# Patient Record
Sex: MALE | Race: WHITE | Employment: FULL TIME | URBAN - METROPOLITAN AREA
[De-identification: names, ages, dates, MRNs, and addresses within clinical notes are randomized per-mention and may not be internally consistent; named-entity substitution may affect disease eponyms.]

---

## 2018-01-30 ENCOUNTER — HOSPITAL ENCOUNTER (OUTPATIENT)
Dept: NUCLEAR MEDICINE | Age: 58
Discharge: HOME | End: 2018-01-30
Attending: PODIATRIST
Payer: COMMERCIAL

## 2018-01-30 ENCOUNTER — HOSPITAL ENCOUNTER (OUTPATIENT)
Dept: LAB | Age: 58
Discharge: HOME | End: 2018-01-30

## 2018-01-30 DIAGNOSIS — M86.172 ACUTE OSTEOMYELITIS OF LEFT ANKLE OR FOOT (HCC): ICD-10-CM

## 2018-01-30 LAB
BASOPHILS # BLD: 0 K/UL (ref 0–0.06)
BASOPHILS NFR BLD: 0 % (ref 0–2)
DIFFERENTIAL METHOD BLD: ABNORMAL
EOSINOPHIL # BLD: 0.7 K/UL (ref 0–0.4)
EOSINOPHIL NFR BLD: 9 % (ref 0–5)
ERYTHROCYTE [DISTWIDTH] IN BLOOD BY AUTOMATED COUNT: 12.2 % (ref 11.6–14.5)
ERYTHROCYTE [SEDIMENTATION RATE] IN BLOOD: 1 MM/HR (ref 0–20)
HCT VFR BLD AUTO: 42.4 % (ref 36–48)
HGB BLD-MCNC: 14.6 G/DL (ref 13–16)
INR PPP: 0.9 (ref 0.8–1.2)
LYMPHOCYTES # BLD: 1.4 K/UL (ref 0.9–3.6)
LYMPHOCYTES NFR BLD: 19 % (ref 21–52)
MCH RBC QN AUTO: 31.3 PG (ref 24–34)
MCHC RBC AUTO-ENTMCNC: 34.4 G/DL (ref 31–37)
MCV RBC AUTO: 90.8 FL (ref 74–97)
MONOCYTES # BLD: 1 K/UL (ref 0.05–1.2)
MONOCYTES NFR BLD: 13 % (ref 3–10)
NEUTS SEG # BLD: 4.3 K/UL (ref 1.8–8)
NEUTS SEG NFR BLD: 59 % (ref 40–73)
PLATELET # BLD AUTO: 196 K/UL (ref 135–420)
PMV BLD AUTO: 9.7 FL (ref 9.2–11.8)
PROTHROMBIN TIME: 12 SEC (ref 11.5–15.2)
RBC # BLD AUTO: 4.67 M/UL (ref 4.7–5.5)
WBC # BLD AUTO: 7.4 K/UL (ref 4.6–13.2)

## 2018-01-30 PROCEDURE — 85610 PROTHROMBIN TIME: CPT | Performed by: PODIATRIST

## 2018-01-30 PROCEDURE — 85652 RBC SED RATE AUTOMATED: CPT | Performed by: PODIATRIST

## 2018-01-30 PROCEDURE — 36415 COLL VENOUS BLD VENIPUNCTURE: CPT | Performed by: PODIATRIST

## 2018-01-30 PROCEDURE — 78315 BONE IMAGING 3 PHASE: CPT

## 2018-01-30 PROCEDURE — 85025 COMPLETE CBC W/AUTO DIFF WBC: CPT | Performed by: PODIATRIST

## 2018-01-30 RX ORDER — IBUPROFEN 200 MG
400 TABLET ORAL
COMMUNITY
End: 2022-04-29 | Stop reason: ALTCHOICE

## 2018-01-30 RX ORDER — CEPHALEXIN 500 MG/1
CAPSULE ORAL 2 TIMES DAILY
COMMUNITY
End: 2018-03-02

## 2018-01-30 RX ORDER — AMLODIPINE BESYLATE 10 MG/1
10 TABLET ORAL DAILY
COMMUNITY

## 2018-01-30 RX ORDER — FLUTICASONE PROPIONATE 50 MCG
2 SPRAY, SUSPENSION (ML) NASAL DAILY
COMMUNITY

## 2018-01-30 RX ORDER — LOSARTAN POTASSIUM 50 MG/1
100 TABLET ORAL DAILY
COMMUNITY

## 2018-01-30 NOTE — PROGRESS NOTES
Pre call completed. PT aware of need to hold anticoagulants per protocol. PT aware of arrival time pre procedure. Pt states no questions at this time.

## 2018-02-01 ENCOUNTER — HOSPITAL ENCOUNTER (OUTPATIENT)
Dept: NUCLEAR MEDICINE | Age: 58
Discharge: HOME | End: 2018-02-01
Attending: PODIATRIST
Payer: COMMERCIAL

## 2018-02-01 DIAGNOSIS — M86.172 ACUTE OSTEOMYELITIS OF LEFT ANKLE OR FOOT (HCC): ICD-10-CM

## 2018-02-01 PROCEDURE — 78805 NM INFLAM PROC LTD: CPT

## 2018-02-02 ENCOUNTER — HOSPITAL ENCOUNTER (OUTPATIENT)
Dept: INFUSION THERAPY | Age: 58
Discharge: HOME | End: 2018-02-02
Payer: COMMERCIAL

## 2018-02-02 ENCOUNTER — HOSPITAL ENCOUNTER (OUTPATIENT)
Dept: INTERVENTIONAL RADIOLOGY/VASCULAR | Age: 58
Discharge: HOME | End: 2018-02-02
Attending: PODIATRIST
Payer: COMMERCIAL

## 2018-02-02 VITALS
OXYGEN SATURATION: 97 % | RESPIRATION RATE: 18 BRPM | BODY MASS INDEX: 25.48 KG/M2 | SYSTOLIC BLOOD PRESSURE: 161 MMHG | HEIGHT: 70 IN | WEIGHT: 178 LBS | TEMPERATURE: 97.8 F | DIASTOLIC BLOOD PRESSURE: 90 MMHG | HEART RATE: 90 BPM

## 2018-02-02 DIAGNOSIS — M86.172 OSTEOMYELITIS OF ANKLE OR FOOT, ACUTE, LEFT (HCC): ICD-10-CM

## 2018-02-02 PROCEDURE — 74011000250 HC RX REV CODE- 250: Performed by: RADIOLOGY

## 2018-02-02 PROCEDURE — 74011000272 HC RX REV CODE- 272: Performed by: PODIATRIST

## 2018-02-02 PROCEDURE — C1751 CATH, INF, PER/CENT/MIDLINE: HCPCS

## 2018-02-02 PROCEDURE — 74011250636 HC RX REV CODE- 250/636: Performed by: RADIOLOGY

## 2018-02-02 PROCEDURE — 74011250636 HC RX REV CODE- 250/636: Performed by: PODIATRIST

## 2018-02-02 PROCEDURE — 96374 THER/PROPH/DIAG INJ IV PUSH: CPT

## 2018-02-02 RX ORDER — HEPARIN SODIUM (PORCINE) LOCK FLUSH IV SOLN 100 UNIT/ML 100 UNIT/ML
500 SOLUTION INTRAVENOUS
Status: COMPLETED | OUTPATIENT
Start: 2018-02-02 | End: 2018-02-02

## 2018-02-02 RX ORDER — HEPARIN 100 UNIT/ML
500 SYRINGE INTRAVENOUS ONCE
Status: COMPLETED | OUTPATIENT
Start: 2018-02-02 | End: 2018-02-02

## 2018-02-02 RX ORDER — SODIUM CHLORIDE 0.9 % (FLUSH) 0.9 %
10 SYRINGE (ML) INJECTION AS NEEDED
Status: CANCELLED | OUTPATIENT
Start: 2018-02-02

## 2018-02-02 RX ORDER — HYDROCHLOROTHIAZIDE 12.5 MG/1
12.5 TABLET ORAL DAILY
COMMUNITY
End: 2022-04-29

## 2018-02-02 RX ORDER — HEPARIN SODIUM 200 [USP'U]/100ML
500 INJECTION, SOLUTION INTRAVENOUS
Status: COMPLETED | OUTPATIENT
Start: 2018-02-02 | End: 2018-02-02

## 2018-02-02 RX ORDER — LIDOCAINE HYDROCHLORIDE 10 MG/ML
1-20 INJECTION INFILTRATION; PERINEURAL
Status: COMPLETED | OUTPATIENT
Start: 2018-02-02 | End: 2018-02-02

## 2018-02-02 RX ORDER — HEPARIN 100 UNIT/ML
500 SYRINGE INTRAVENOUS ONCE
Status: CANCELLED | OUTPATIENT
Start: 2018-02-02 | End: 2018-02-02

## 2018-02-02 RX ORDER — SODIUM CHLORIDE 0.9 % (FLUSH) 0.9 %
10 SYRINGE (ML) INJECTION AS NEEDED
Status: DISCONTINUED | OUTPATIENT
Start: 2018-02-02 | End: 2018-02-06 | Stop reason: HOSPADM

## 2018-02-02 RX ADMIN — HEPARIN SODIUM (PORCINE) LOCK FLUSH IV SOLN 100 UNIT/ML 500 UNITS: 100 SOLUTION at 12:28

## 2018-02-02 RX ADMIN — LIDOCAINE HYDROCHLORIDE 2 ML: 10 INJECTION, SOLUTION INFILTRATION; PERINEURAL at 12:29

## 2018-02-02 RX ADMIN — DAPTOMYCIN 500 MG: 500 INJECTION, POWDER, LYOPHILIZED, FOR SOLUTION INTRAVENOUS at 13:04

## 2018-02-02 RX ADMIN — HEPARIN 500 UNITS: 100 SYRINGE at 13:08

## 2018-02-02 RX ADMIN — Medication 10 ML: at 13:08

## 2018-02-02 RX ADMIN — Medication 1000 UNITS: at 12:28

## 2018-02-02 RX ADMIN — Medication 10 ML: at 13:03

## 2018-02-02 NOTE — PROGRESS NOTES
[facility] OPIC Progress Note    Date: 2018    Name: Sheyla Martinez    MRN: [id number]         : 1960      Mr. Shane Leahy arrived to [facility] at 1250. Mr. Shane Leahy was assessed and education was provided. Written carenotes and verbal education provided for Daptomycin. Patient verbalizied understanding. Mr. Anushka Rodney vitals were reviewed. Visit Vitals    /90 (BP 1 Location: Left arm, BP Patient Position: Sitting)    Pulse 90    Temp 97.8 °F (36.6 °C)    Resp 18    Ht 5' 10\" (1.778 m)    Wt 80.7 kg (178 lb)    SpO2 97%    BMI 25.54 kg/m2       Pt with Right upper arm double lumen PICC line. Each lumen flushes without difficulty and positive for blood return. Dressing CDI. No redness, bruising, or swelling noted at site and/ or extremity. Pt denies tenderness. Daptomycin  500 mg was administered as ordered via purple lumen followed by normal saline flush. Mr. Shane Leahy tolerated well without complaints. Flushed each lumen of pt's PICC line with heparin per order. New curos caps applied to the end of each lumen. Lumens wrapped in gauze and paper tape. Mr. Shane Leahy was discharged from [place] [address] in stable condition at 1335 following a 30 minute observation. He is to return on February 3, 2018 at 1130 for his next appointment.     Segun De Luna RN  2018

## 2018-02-03 ENCOUNTER — HOSPITAL ENCOUNTER (OUTPATIENT)
Dept: INFUSION THERAPY | Age: 58
Discharge: HOME | End: 2018-02-03
Payer: COMMERCIAL

## 2018-02-03 VITALS
RESPIRATION RATE: 18 BRPM | SYSTOLIC BLOOD PRESSURE: 131 MMHG | HEART RATE: 83 BPM | TEMPERATURE: 96.8 F | DIASTOLIC BLOOD PRESSURE: 73 MMHG | OXYGEN SATURATION: 97 %

## 2018-02-03 PROCEDURE — 96374 THER/PROPH/DIAG INJ IV PUSH: CPT

## 2018-02-03 PROCEDURE — 74011000272 HC RX REV CODE- 272: Performed by: PODIATRIST

## 2018-02-03 PROCEDURE — 74011250636 HC RX REV CODE- 250/636: Performed by: PODIATRIST

## 2018-02-03 RX ORDER — SODIUM CHLORIDE 0.9 % (FLUSH) 0.9 %
10 SYRINGE (ML) INJECTION AS NEEDED
Status: DISCONTINUED | OUTPATIENT
Start: 2018-02-03 | End: 2018-02-07 | Stop reason: HOSPADM

## 2018-02-03 RX ORDER — HEPARIN 100 UNIT/ML
500 SYRINGE INTRAVENOUS ONCE
Status: COMPLETED | OUTPATIENT
Start: 2018-02-03 | End: 2018-02-03

## 2018-02-03 RX ADMIN — HEPARIN 500 UNITS: 100 SYRINGE at 08:25

## 2018-02-03 RX ADMIN — Medication 10 ML: at 08:19

## 2018-02-03 RX ADMIN — DAPTOMYCIN 500 MG: 500 INJECTION, POWDER, LYOPHILIZED, FOR SOLUTION INTRAVENOUS at 08:21

## 2018-02-03 RX ADMIN — Medication 10 ML: at 08:25

## 2018-02-03 NOTE — PROGRESS NOTES
[facility] Progress Note    Date: February 3, 2018    Name: Kike Augustin    MRN: [id number]         : 1960      Mr. Cesar Connor arrived to [facility] at 26. Mr. Cesar Connor was assessed and education was provided. Mr. Jerman Starks vitals were reviewed. Visit Vitals    /73 (BP 1 Location: Left arm, BP Patient Position: Sitting)    Pulse 83    Temp 96.8 °F (36 °C)    Resp 18    SpO2 97%       Pt with Right upper arm double lumen PICC line. Each lumen flushes without difficulty and positive for blood return. Dressing CDI. No redness, bruising, or swelling noted at site and/ or extremity. Pt denies tenderness. Daptomycin 500 mg was administered as ordered via purple lumen followed by normal saline flush. Mr. Cesar Connor tolerated well without complaints. Flushed each lumen of pt's PICC line with heparin per order. New curos caps applied to the end of each lumen. Lumens wrapped in gauze and tape. Mr. Cesar Connor was discharged from [place] [address] in stable condition at 830. He is to return on 2018 at 830 for his next appointment.     Mikayla Casas RN  February 3, 2018

## 2018-02-04 ENCOUNTER — HOSPITAL ENCOUNTER (OUTPATIENT)
Dept: INFUSION THERAPY | Age: 58
Discharge: HOME | End: 2018-02-04
Payer: COMMERCIAL

## 2018-02-04 VITALS
RESPIRATION RATE: 18 BRPM | DIASTOLIC BLOOD PRESSURE: 78 MMHG | OXYGEN SATURATION: 97 % | SYSTOLIC BLOOD PRESSURE: 154 MMHG | TEMPERATURE: 97.3 F | HEART RATE: 84 BPM

## 2018-02-04 PROCEDURE — 74011250636 HC RX REV CODE- 250/636: Performed by: PODIATRIST

## 2018-02-04 PROCEDURE — 96374 THER/PROPH/DIAG INJ IV PUSH: CPT

## 2018-02-04 PROCEDURE — 74011000272 HC RX REV CODE- 272: Performed by: PODIATRIST

## 2018-02-04 RX ORDER — SODIUM CHLORIDE 0.9 % (FLUSH) 0.9 %
10 SYRINGE (ML) INJECTION EVERY 8 HOURS
Status: DISCONTINUED | OUTPATIENT
Start: 2018-02-04 | End: 2018-02-08 | Stop reason: HOSPADM

## 2018-02-04 RX ORDER — HEPARIN 100 UNIT/ML
500 SYRINGE INTRAVENOUS ONCE
Status: COMPLETED | OUTPATIENT
Start: 2018-02-04 | End: 2018-02-04

## 2018-02-04 RX ADMIN — HEPARIN 500 UNITS: 100 SYRINGE at 08:19

## 2018-02-04 RX ADMIN — DAPTOMYCIN 500 MG: 500 INJECTION, POWDER, LYOPHILIZED, FOR SOLUTION INTRAVENOUS at 08:15

## 2018-02-04 RX ADMIN — Medication 10 ML: at 08:14

## 2018-02-04 RX ADMIN — Medication 10 ML: at 08:19

## 2018-02-04 NOTE — PROGRESS NOTES
[facility] Progress Note    Date: 2018    Name: Sheyla Martinez    MRN: [id number]         : 1960      Mr. Shane Leahy arrived to [facility] at 810. Mr. Shane Leahy was assessed and education was provided. Patient denies any complaints today. Reports the pain in his foot is gone. Mr. Anushka Rodney vitals were reviewed. Visit Vitals    /78 (BP 1 Location: Left arm, BP Patient Position: Sitting)    Pulse 84    Temp 97.3 °F (36.3 °C)    Resp 18    SpO2 97%       Pt with Right upper arm double lumen PICC line. Each lumen flushes without difficulty and positive for blood return. Dressing CDI. No redness, bruising, or swelling noted at site and/ or extremity. Pt denies tenderness. Daptomycin 500 mg was administered as ordered via purple lumen followed by normal saline flush. Mr. Shane Leahy tolerated well without complaints. Flushed each lumen of pt's PICC line with heparin per order. New curos caps applied to the end of each lumen. Lumens wrapped in gauze and paper tape. Mr. Shane Leahy was discharged from [place] [address] in stable condition at 825. He is to return on 2018 at 930 for his next appointment.     Segun De Luna RN  2018

## 2018-02-05 ENCOUNTER — HOSPITAL ENCOUNTER (OUTPATIENT)
Dept: INFUSION THERAPY | Age: 58
Discharge: HOME | End: 2018-02-05
Payer: COMMERCIAL

## 2018-02-05 VITALS
DIASTOLIC BLOOD PRESSURE: 85 MMHG | OXYGEN SATURATION: 99 % | RESPIRATION RATE: 18 BRPM | SYSTOLIC BLOOD PRESSURE: 151 MMHG | HEART RATE: 79 BPM | TEMPERATURE: 97.9 F

## 2018-02-05 LAB — ERYTHROCYTE [SEDIMENTATION RATE] IN BLOOD: 3 MM/HR (ref 0–20)

## 2018-02-05 PROCEDURE — 85652 RBC SED RATE AUTOMATED: CPT | Performed by: PODIATRIST

## 2018-02-05 PROCEDURE — 96374 THER/PROPH/DIAG INJ IV PUSH: CPT

## 2018-02-05 PROCEDURE — 74011250636 HC RX REV CODE- 250/636: Performed by: PODIATRIST

## 2018-02-05 PROCEDURE — 74011000272 HC RX REV CODE- 272: Performed by: PODIATRIST

## 2018-02-05 RX ORDER — SODIUM CHLORIDE 0.9 % (FLUSH) 0.9 %
10-40 SYRINGE (ML) INJECTION AS NEEDED
Status: DISCONTINUED | OUTPATIENT
Start: 2018-02-05 | End: 2018-02-09 | Stop reason: HOSPADM

## 2018-02-05 RX ORDER — HEPARIN 100 UNIT/ML
500 SYRINGE INTRAVENOUS ONCE
Status: COMPLETED | OUTPATIENT
Start: 2018-02-05 | End: 2018-02-05

## 2018-02-05 RX ADMIN — HEPARIN 500 UNITS: 100 SYRINGE at 09:23

## 2018-02-05 RX ADMIN — DAPTOMYCIN 500 MG: 500 INJECTION, POWDER, LYOPHILIZED, FOR SOLUTION INTRAVENOUS at 09:20

## 2018-02-05 RX ADMIN — Medication 30 ML: at 09:23

## 2018-02-05 NOTE — PROGRESS NOTES
[facility]    Date: 2018    Name: Pricila Morrison    MRN: [id number]         : 1960    Cubicin Infusion    Mr. Ina Manuel to [facility], ambulatory at 0910. Pt was assessed and education was provided. Mr. David Ryan vitals were reviewed. Visit Vitals    /85 (BP 1 Location: Left arm, BP Patient Position: Sitting)    Pulse 79    Temp 97.9 °F (36.6 °C)    Resp 18    SpO2 99%       Right upper arm PICC flushed easily and had brisk blood return via both ports. Blood drawn off and sent to lab for sed rate after 10 ml waste per written orders. PICC dressing c/d/i and not due to be changed. No swelling, redness, streaking, warmth or drainage noted in arm. Pt denied c/o pain in arm.      []  Vancomycin     []  Invanz     [x]  Cubicin  500 mg     []  Rocephin    was infused slowly over 2 minutes. Mr. Ina Manuel tolerated infusion, and had no complaints at this time. Both lumens of PICC flushed with NS 10 ml and Heparin 250 units. Green end caps applied, and lumens wrapped with guaze and paper tape. Stockinette placed over dressing for protection. Patient armband removed and shredded. Mr. Ina Manuel was discharged from [place] [address] in stable condition at 0930. He is to return on 18 at 1330 for his next antibiotic appointment.     Nael Harrison RN  2018

## 2018-02-06 ENCOUNTER — HOSPITAL ENCOUNTER (OUTPATIENT)
Dept: INFUSION THERAPY | Age: 58
Discharge: HOME | End: 2018-02-06
Payer: COMMERCIAL

## 2018-02-06 VITALS
HEART RATE: 83 BPM | RESPIRATION RATE: 18 BRPM | DIASTOLIC BLOOD PRESSURE: 83 MMHG | TEMPERATURE: 96.8 F | SYSTOLIC BLOOD PRESSURE: 145 MMHG | OXYGEN SATURATION: 98 %

## 2018-02-06 PROCEDURE — 74011250636 HC RX REV CODE- 250/636

## 2018-02-06 PROCEDURE — 74011000272 HC RX REV CODE- 272: Performed by: PODIATRIST

## 2018-02-06 PROCEDURE — 74011250636 HC RX REV CODE- 250/636: Performed by: PODIATRIST

## 2018-02-06 PROCEDURE — 96374 THER/PROPH/DIAG INJ IV PUSH: CPT

## 2018-02-06 RX ORDER — HEPARIN 100 UNIT/ML
500 SYRINGE INTRAVENOUS ONCE
Status: ACTIVE | OUTPATIENT
Start: 2018-02-06 | End: 2018-02-07

## 2018-02-06 RX ORDER — SODIUM CHLORIDE 0.9 % (FLUSH) 0.9 %
10 SYRINGE (ML) INJECTION AS NEEDED
Status: DISCONTINUED | OUTPATIENT
Start: 2018-02-06 | End: 2018-02-10 | Stop reason: HOSPADM

## 2018-02-06 RX ORDER — HEPARIN 100 UNIT/ML
SYRINGE INTRAVENOUS
Status: COMPLETED
Start: 2018-02-06 | End: 2018-02-06

## 2018-02-06 RX ADMIN — Medication 10 ML: at 13:32

## 2018-02-06 RX ADMIN — DAPTOMYCIN 500 MG: 500 INJECTION, POWDER, LYOPHILIZED, FOR SOLUTION INTRAVENOUS at 13:29

## 2018-02-06 RX ADMIN — HEPARIN 500 UNITS: 100 SYRINGE at 13:32

## 2018-02-06 RX ADMIN — Medication 10 ML: at 13:28

## 2018-02-06 NOTE — PROGRESS NOTES
[facility] Progress Note    Date: 2018    Name: Abbey Choudhary    MRN: [id number]         : 1960      Mr. Tamanna Laguerre arrived to [facility] at 36. Mr. Tamanna Laguerre was assessed and education was provided. Patient denies any complaints today. Mr. Anna Hudson vitals were reviewed. Visit Vitals    /83 (BP 1 Location: Left arm, BP Patient Position: Sitting)    Pulse 83    Temp 96.8 °F (36 °C)    Resp 18    SpO2 98%       Pt with Right upper arm double lumen PICC line. Each lumen flushes without difficulty and positive for blood return. Dressing CDI. No redness, bruising, or swelling noted at site and/ or extremity. Pt denies tenderness. Daptomycin 500mg was administered as ordered via purple lumen followed by normal saline flush. Mr. Tamanna Laguerre tolerated well without complaints. Flushed each lumen of pt's PICC line with heparin per order. New curos caps applied to the end of each lumen. Lumens wrapped in gauze and paper tape. Mr. Tamanna Laguerre was discharged from [place] [address] in stable condition at 1335. He is to return on 2018 at 900 for his next appointment.     Patricia Van RN  2018

## 2018-02-07 ENCOUNTER — HOSPITAL ENCOUNTER (OUTPATIENT)
Dept: INFUSION THERAPY | Age: 58
Discharge: HOME | End: 2018-02-07
Payer: COMMERCIAL

## 2018-02-07 VITALS
TEMPERATURE: 97.4 F | DIASTOLIC BLOOD PRESSURE: 82 MMHG | RESPIRATION RATE: 18 BRPM | HEART RATE: 79 BPM | OXYGEN SATURATION: 98 % | SYSTOLIC BLOOD PRESSURE: 145 MMHG

## 2018-02-07 PROCEDURE — 74011000272 HC RX REV CODE- 272: Performed by: PODIATRIST

## 2018-02-07 PROCEDURE — 74011250636 HC RX REV CODE- 250/636: Performed by: PODIATRIST

## 2018-02-07 PROCEDURE — 96374 THER/PROPH/DIAG INJ IV PUSH: CPT

## 2018-02-07 RX ORDER — HEPARIN 100 UNIT/ML
500 SYRINGE INTRAVENOUS ONCE
Status: COMPLETED | OUTPATIENT
Start: 2018-02-07 | End: 2018-02-07

## 2018-02-07 RX ORDER — SODIUM CHLORIDE 0.9 % (FLUSH) 0.9 %
5-10 SYRINGE (ML) INJECTION AS NEEDED
Status: DISCONTINUED | OUTPATIENT
Start: 2018-02-07 | End: 2018-02-11 | Stop reason: HOSPADM

## 2018-02-07 RX ADMIN — HEPARIN 500 UNITS: 100 SYRINGE at 09:08

## 2018-02-07 RX ADMIN — Medication 10 ML: at 09:07

## 2018-02-07 RX ADMIN — DAPTOMYCIN 500 MG: 500 INJECTION, POWDER, LYOPHILIZED, FOR SOLUTION INTRAVENOUS at 09:04

## 2018-02-07 NOTE — PROGRESS NOTES
[facility] Progress Note    Date: 2018    Name: Jesus Pimentel    MRN: [id number]         : 1960     IV Antibiotics    Mr. Sydney Quintero was assessed and education was provided. Mr. Linda Ross vitals were reviewed. Visit Vitals    /82 (BP 1 Location: Left arm, BP Patient Position: Sitting)    Pulse 79    Temp 97.4 °F (36.3 °C)    Resp 18    SpO2 98%       Lab results were obtained and reviewed. No results found for this or any previous visit (from the past 12 hour(s)). []  Vancomycin     []  Invanz     [x]  Cubicin 500 mg IV push     []  Rocephin    was infused per orders without complications. Mr. Sydney Quintero tolerated infusion, and had no complaints at this time. Patient armband removed and shredded. Mr. Sydney Quintero was discharged from [place] [address] in stable condition at 0910. He is to return on 18 for his next appointment.     Swapnil Mahajan RN  2018  10:00 AM

## 2018-02-08 ENCOUNTER — HOSPITAL ENCOUNTER (OUTPATIENT)
Dept: INFUSION THERAPY | Age: 58
Discharge: HOME | End: 2018-02-08
Payer: COMMERCIAL

## 2018-02-08 VITALS
RESPIRATION RATE: 18 BRPM | TEMPERATURE: 98.9 F | OXYGEN SATURATION: 98 % | SYSTOLIC BLOOD PRESSURE: 154 MMHG | DIASTOLIC BLOOD PRESSURE: 80 MMHG | HEART RATE: 84 BPM

## 2018-02-08 PROCEDURE — 74011000272 HC RX REV CODE- 272: Performed by: PODIATRIST

## 2018-02-08 PROCEDURE — 96374 THER/PROPH/DIAG INJ IV PUSH: CPT

## 2018-02-08 PROCEDURE — 74011250636 HC RX REV CODE- 250/636: Performed by: PODIATRIST

## 2018-02-08 RX ORDER — HEPARIN 100 UNIT/ML
500 SYRINGE INTRAVENOUS ONCE
Status: COMPLETED | OUTPATIENT
Start: 2018-02-08 | End: 2018-02-08

## 2018-02-08 RX ORDER — HEPARIN 100 UNIT/ML
SYRINGE INTRAVENOUS
Status: DISPENSED
Start: 2018-02-08 | End: 2018-02-09

## 2018-02-08 RX ORDER — SODIUM CHLORIDE 0.9 % (FLUSH) 0.9 %
10-40 SYRINGE (ML) INJECTION AS NEEDED
Status: DISCONTINUED | OUTPATIENT
Start: 2018-02-08 | End: 2018-02-12 | Stop reason: HOSPADM

## 2018-02-08 RX ADMIN — DAPTOMYCIN 500 MG: 500 INJECTION, POWDER, LYOPHILIZED, FOR SOLUTION INTRAVENOUS at 13:05

## 2018-02-08 RX ADMIN — HEPARIN 500 UNITS: 100 SYRINGE at 13:09

## 2018-02-08 RX ADMIN — Medication 30 ML: at 13:09

## 2018-02-08 NOTE — PROGRESS NOTES
[facility] Progress Note    Date: 2018    Name: Criss Canales    MRN: [id number]         : 1960    Cubicin Infusion    Mr. Marlys Bridges to [facility], ambulatory at 1255 . Pt was assessed and education was provided. Mr. Lolis Ventura vitals were reviewed. Visit Vitals    /80 (BP 1 Location: Left arm, BP Patient Position: Sitting)    Pulse 84    Temp 98.9 °F (37.2 °C)    Resp 18    SpO2 98%       Right upper arm PICC flushed easily and had brisk blood return via both ports. PICC dressing c/d/i and not due to be changed. No swelling, redness, streaking, warmth or drainage noted in arm. Pt denied c/o pain in arm.      []  Vancomycin     []  Invanz     [x]  Cubicin 500 mg     []  Rocephin   Was infused slowly over approximately 2 minutes. Mr. Marlys Bridges tolerated infusion, and had no complaints at this time. Both lumens of PICC flushed with NS 10 ml and Heparin 250 units. Green end caps applied, and lumens wrapped with guaze and paper tape. Stockinette placed over dressing for protection. Patient armband removed and shredded. Mr. Marlys Bridges was discharged from [place] [address] in stable condition at 1315. He is to return on 18 at 1300 for his next antibiotic appointment.     Leland Carrero RN  2018

## 2018-02-09 ENCOUNTER — HOSPITAL ENCOUNTER (OUTPATIENT)
Dept: INFUSION THERAPY | Age: 58
Discharge: HOME | End: 2018-02-09
Payer: COMMERCIAL

## 2018-02-09 VITALS
HEART RATE: 82 BPM | SYSTOLIC BLOOD PRESSURE: 151 MMHG | TEMPERATURE: 98.7 F | RESPIRATION RATE: 18 BRPM | OXYGEN SATURATION: 98 % | DIASTOLIC BLOOD PRESSURE: 82 MMHG

## 2018-02-09 PROCEDURE — 74011000272 HC RX REV CODE- 272: Performed by: PODIATRIST

## 2018-02-09 PROCEDURE — 74011250636 HC RX REV CODE- 250/636: Performed by: PODIATRIST

## 2018-02-09 PROCEDURE — 77030020847 HC STATLOK BARD -A

## 2018-02-09 PROCEDURE — 96374 THER/PROPH/DIAG INJ IV PUSH: CPT

## 2018-02-09 RX ORDER — HEPARIN 100 UNIT/ML
500 SYRINGE INTRAVENOUS ONCE
Status: COMPLETED | OUTPATIENT
Start: 2018-02-09 | End: 2018-02-09

## 2018-02-09 RX ORDER — SODIUM CHLORIDE 0.9 % (FLUSH) 0.9 %
5-10 SYRINGE (ML) INJECTION AS NEEDED
Status: DISCONTINUED | OUTPATIENT
Start: 2018-02-09 | End: 2018-02-13 | Stop reason: HOSPADM

## 2018-02-09 RX ADMIN — DAPTOMYCIN 500 MG: 500 INJECTION, POWDER, LYOPHILIZED, FOR SOLUTION INTRAVENOUS at 13:19

## 2018-02-09 RX ADMIN — Medication 10 ML: at 13:22

## 2018-02-09 RX ADMIN — HEPARIN 500 UNITS: 100 SYRINGE at 13:22

## 2018-02-09 NOTE — PROGRESS NOTES
[facility] Progress Note    Date: 2018    Name: Mirian Cintron    MRN: [id number]         : 1960     IV Antibiotics    Mr. Mishel Roberson was assessed and education was provided. Mr. Juan Antonio Lux vitals were reviewed. Visit Vitals    /82 (BP 1 Location: Left arm, BP Patient Position: Sitting)    Pulse 82    Temp 98.7 °F (37.1 °C)    Resp 18    SpO2 98%             []  Vancomycin     []  Invanz     [x]  Cubicin 500 mg IV push     []  Rocephin    was infused per orders without complications. PICC line flushed and dressing changed per orders, per protocol without complications. Mr. Mishel Roberson tolerated infusion and dressing change and had no complaints at this time. Patient armband removed and shredded. Mr. Mishel Roberson was discharged from [place] [address] in stable condition at 5. He is to return on 2/10/18 for his next appointment.     Yu Harrison RN  2018  3:29 PM

## 2018-02-10 ENCOUNTER — HOSPITAL ENCOUNTER (OUTPATIENT)
Dept: INFUSION THERAPY | Age: 58
Discharge: HOME | End: 2018-02-10
Payer: COMMERCIAL

## 2018-02-10 VITALS
OXYGEN SATURATION: 97 % | DIASTOLIC BLOOD PRESSURE: 87 MMHG | HEART RATE: 82 BPM | RESPIRATION RATE: 18 BRPM | TEMPERATURE: 97.8 F | SYSTOLIC BLOOD PRESSURE: 150 MMHG

## 2018-02-10 PROCEDURE — 74011000272 HC RX REV CODE- 272: Performed by: PODIATRIST

## 2018-02-10 PROCEDURE — 96374 THER/PROPH/DIAG INJ IV PUSH: CPT

## 2018-02-10 PROCEDURE — 74011250636 HC RX REV CODE- 250/636: Performed by: PODIATRIST

## 2018-02-10 RX ORDER — HEPARIN 100 UNIT/ML
500 SYRINGE INTRAVENOUS ONCE
Status: COMPLETED | OUTPATIENT
Start: 2018-02-10 | End: 2018-02-10

## 2018-02-10 RX ORDER — SODIUM CHLORIDE 0.9 % (FLUSH) 0.9 %
10-40 SYRINGE (ML) INJECTION AS NEEDED
Status: DISCONTINUED | OUTPATIENT
Start: 2018-02-10 | End: 2018-02-14 | Stop reason: HOSPADM

## 2018-02-10 RX ADMIN — Medication 20 ML: at 09:50

## 2018-02-10 RX ADMIN — Medication 10 ML: at 09:54

## 2018-02-10 RX ADMIN — DAPTOMYCIN 500 MG: 500 INJECTION, POWDER, LYOPHILIZED, FOR SOLUTION INTRAVENOUS at 09:51

## 2018-02-10 RX ADMIN — HEPARIN 500 UNITS: 100 SYRINGE at 09:54

## 2018-02-11 ENCOUNTER — HOSPITAL ENCOUNTER (OUTPATIENT)
Dept: INFUSION THERAPY | Age: 58
Discharge: HOME | End: 2018-02-11
Payer: COMMERCIAL

## 2018-02-11 VITALS
RESPIRATION RATE: 18 BRPM | DIASTOLIC BLOOD PRESSURE: 77 MMHG | OXYGEN SATURATION: 98 % | HEART RATE: 83 BPM | SYSTOLIC BLOOD PRESSURE: 140 MMHG | TEMPERATURE: 97.3 F

## 2018-02-11 PROCEDURE — 74011250636 HC RX REV CODE- 250/636: Performed by: INTERNAL MEDICINE

## 2018-02-11 PROCEDURE — 96374 THER/PROPH/DIAG INJ IV PUSH: CPT

## 2018-02-11 PROCEDURE — 74011250636 HC RX REV CODE- 250/636: Performed by: PODIATRIST

## 2018-02-11 PROCEDURE — 74011000272 HC RX REV CODE- 272: Performed by: PODIATRIST

## 2018-02-11 RX ORDER — HEPARIN 100 UNIT/ML
500 SYRINGE INTRAVENOUS ONCE
Status: COMPLETED | OUTPATIENT
Start: 2018-02-11 | End: 2018-02-11

## 2018-02-11 RX ORDER — SODIUM CHLORIDE 0.9 % (FLUSH) 0.9 %
10-40 SYRINGE (ML) INJECTION AS NEEDED
Status: DISCONTINUED | OUTPATIENT
Start: 2018-02-11 | End: 2018-02-15 | Stop reason: HOSPADM

## 2018-02-11 RX ADMIN — DAPTOMYCIN 500 MG: 500 INJECTION, POWDER, LYOPHILIZED, FOR SOLUTION INTRAVENOUS at 09:29

## 2018-02-11 RX ADMIN — Medication 10 ML: at 09:33

## 2018-02-11 RX ADMIN — Medication 20 ML: at 09:28

## 2018-02-11 RX ADMIN — Medication 500 UNITS: at 09:34

## 2018-02-11 NOTE — PROGRESS NOTES
[facility] Progress Note    Date: February 10, 2018    Name: Lei Michaud    MRN: [id number]         : 1960     IV Antibiotics    Mr. Irena Doshi was assessed and education was provided. Mr. Shanika Rosa vitals were reviewed. Visit Vitals    /87 (BP 1 Location: Left arm, BP Patient Position: Sitting)    Pulse 82    Temp 97.8 °F (36.6 °C)    Resp 18    SpO2 97%             []  Vancomycin     []  Invanz     [x]  Cubicin 500 mg IV push     []  Rocephin    was infused per orders without complications. PICC line flushed and dressing changed per orders, per protocol without complications. Mr. Irena Doshi tolerated infusion and dressing change and had no complaints at this time. Patient armband removed and shredded. Mr. Irena Doshi was discharged from [place] [address] in stable condition at 10 [facility]. He is to return on 18 for his next appointment.     Bianka Sanon RN  February 10, 2018

## 2018-02-11 NOTE — PROGRESS NOTES
[facility] Progress Note    Date: 2018    Name: Veda Escobar    MRN: [id number]         : 1960     IV Antibiotics    Mr. Ban Smith was assessed and education was provided. Mr. Sushma Javed vitals were reviewed. Visit Vitals    /77    Pulse 83    Temp 97.3 °F (36.3 °C)    Resp 18    SpO2 98%             []  Vancomycin     []  Invanz     [x]  Cubicin 500 mg IV push     []  Rocephin    was infused per orders without complications. PICC line flushed and dressing changed per orders, per protocol without complications. Mr. Ban Smith tolerated infusion and dressing change and had no complaints at this time. Patient armband removed and shredded. Mr. Ban Smith was discharged from [place] [address] in stable condition at [address] [place] [address]. He is to return on 18 for his next appointment.     Maryan Ortiz RN  2018

## 2018-02-12 ENCOUNTER — HOSPITAL ENCOUNTER (OUTPATIENT)
Dept: INFUSION THERAPY | Age: 58
Discharge: HOME | End: 2018-02-12
Payer: COMMERCIAL

## 2018-02-12 VITALS
DIASTOLIC BLOOD PRESSURE: 73 MMHG | HEART RATE: 85 BPM | OXYGEN SATURATION: 98 % | SYSTOLIC BLOOD PRESSURE: 142 MMHG | RESPIRATION RATE: 18 BRPM | TEMPERATURE: 98.3 F

## 2018-02-12 LAB — ERYTHROCYTE [SEDIMENTATION RATE] IN BLOOD: 5 MM/HR (ref 0–20)

## 2018-02-12 PROCEDURE — 36415 COLL VENOUS BLD VENIPUNCTURE: CPT | Performed by: PODIATRIST

## 2018-02-12 PROCEDURE — 74011250636 HC RX REV CODE- 250/636: Performed by: INTERNAL MEDICINE

## 2018-02-12 PROCEDURE — 96374 THER/PROPH/DIAG INJ IV PUSH: CPT

## 2018-02-12 PROCEDURE — 74011250636 HC RX REV CODE- 250/636: Performed by: PODIATRIST

## 2018-02-12 PROCEDURE — 85652 RBC SED RATE AUTOMATED: CPT | Performed by: PODIATRIST

## 2018-02-12 PROCEDURE — 74011000272 HC RX REV CODE- 272: Performed by: PODIATRIST

## 2018-02-12 RX ORDER — HEPARIN 100 UNIT/ML
500 SYRINGE INTRAVENOUS AS NEEDED
Status: DISCONTINUED | OUTPATIENT
Start: 2018-02-12 | End: 2018-02-16 | Stop reason: HOSPADM

## 2018-02-12 RX ORDER — SODIUM CHLORIDE 0.9 % (FLUSH) 0.9 %
10-40 SYRINGE (ML) INJECTION AS NEEDED
Status: DISCONTINUED | OUTPATIENT
Start: 2018-02-12 | End: 2018-02-16 | Stop reason: HOSPADM

## 2018-02-12 RX ADMIN — DAPTOMYCIN 500 MG: 500 INJECTION, POWDER, LYOPHILIZED, FOR SOLUTION INTRAVENOUS at 13:18

## 2018-02-12 RX ADMIN — HEPARIN 500 UNITS: 100 SYRINGE at 13:24

## 2018-02-12 RX ADMIN — Medication 40 ML: at 13:22

## 2018-02-12 NOTE — PROGRESS NOTES
[facility] Progress Note    Date: 2018    Name: Ivy Adams    MRN: [id number]         : 1960    Mr. Paula Sandy was assessed and education was provided. Mr. Mary Barrera vitals were reviewed. Visit Vitals    /73 (BP 1 Location: Left arm, BP Patient Position: Sitting)    Pulse 85    Temp 98.3 °F (36.8 °C)    Resp 18    SpO2 98%     Good blood return obtained from each lumen of PICC line at right upper arm. Labs drawn from red lumen. Red lumen flushed with 20 ml NS and purple with 10 ml NS. Lab results were obtained and reviewed. Recent Results (from the past 12 hour(s))   SED RATE (ESR)    Collection Time: 18  1:20 PM   Result Value Ref Range    Sed rate, automated 5 0 - 20 mm/hr           []  Vancomycin     []  Invanz     [x]  Cubicin 500 mg/10 ml     []  Rocephin    was infused by slow IVP via red lumen, followed by NS 10 ml flush. Each lumen flushed with 2.5 ml of 100 units/ml Heparin, curos caps applied, then lumen wrapped and secured. Dressing changed at PICC site. No irritation, ecchymosis, erythema or drainage noted at site. New Bio Patch, Stat Conor and Tegaderm dressing applied. Mr. Paula Sandy tolerated infusion, and had no complaints at this time. Patient armband removed and shredded. Mr. Paula Sandy was discharged from [place] [address] in stable condition at 1330. He is to return on 2018 at 1100 for his next appointment for antibiotics.     Jose Silva RN  2018  4:52 PM

## 2018-02-13 ENCOUNTER — HOSPITAL ENCOUNTER (OUTPATIENT)
Dept: INFUSION THERAPY | Age: 58
Discharge: HOME | End: 2018-02-13
Payer: COMMERCIAL

## 2018-02-13 VITALS
RESPIRATION RATE: 18 BRPM | DIASTOLIC BLOOD PRESSURE: 70 MMHG | TEMPERATURE: 98.2 F | HEART RATE: 87 BPM | SYSTOLIC BLOOD PRESSURE: 135 MMHG

## 2018-02-13 PROCEDURE — 74011000272 HC RX REV CODE- 272: Performed by: PODIATRIST

## 2018-02-13 PROCEDURE — 74011250636 HC RX REV CODE- 250/636: Performed by: PODIATRIST

## 2018-02-13 PROCEDURE — 96374 THER/PROPH/DIAG INJ IV PUSH: CPT

## 2018-02-13 RX ORDER — SODIUM CHLORIDE 0.9 % (FLUSH) 0.9 %
10 SYRINGE (ML) INJECTION AS NEEDED
Status: DISCONTINUED | OUTPATIENT
Start: 2018-02-13 | End: 2018-02-17 | Stop reason: HOSPADM

## 2018-02-13 RX ORDER — HEPARIN 100 UNIT/ML
500 SYRINGE INTRAVENOUS ONCE
Status: COMPLETED | OUTPATIENT
Start: 2018-02-13 | End: 2018-02-13

## 2018-02-13 RX ADMIN — DAPTOMYCIN 500 MG: 500 INJECTION, POWDER, LYOPHILIZED, FOR SOLUTION INTRAVENOUS at 11:00

## 2018-02-13 RX ADMIN — HEPARIN 500 UNITS: 100 SYRINGE at 11:03

## 2018-02-13 RX ADMIN — Medication 10 ML: at 10:58

## 2018-02-13 RX ADMIN — Medication 10 ML: at 11:03

## 2018-02-13 NOTE — PROGRESS NOTES
[facility] Progress Note    Date: 2018    Name: Kike Augustin    MRN: [id number]         : 1960     IV Antibiotics    Mr. Cesar Connor was assessed and education was provided. Pt denies any reaction or complications, says \" I am seeing Dr. Klein Records on Friday and may have my treatment completed. I feel better. \"    Mr. Jerman Starks vitals were reviewed. Visit Vitals    /70 (BP 1 Location: Left arm, BP Patient Position: Sitting)    Pulse 87    Temp 98.2 °F (36.8 °C)    Resp 18             []  Vancomycin     []  Invanz     [x]  Cubicin 500 mg IV push     []  Rocephin    was infused  Over 2 minutes per orders without complications. PICC line flushed with 5 ml normal saline in bilateral lumens, the instilled 250 units heparin in bilateral lumens, covered with curos caps, appled 4x4 and secured with paper tape and a stockinette. Mr. Cesar Connor tolerated without complications of   Patient armband removed and shredded. Mr. Cesar Connor was discharged from [place] [address] in stable condition at 1105. He is to return on 18 for his next appointment.     Yara Otoole RN  2018  3:29 PM

## 2018-02-14 ENCOUNTER — HOSPITAL ENCOUNTER (OUTPATIENT)
Dept: INFUSION THERAPY | Age: 58
Discharge: HOME | End: 2018-02-14
Payer: COMMERCIAL

## 2018-02-14 VITALS
RESPIRATION RATE: 18 BRPM | HEART RATE: 94 BPM | TEMPERATURE: 98.2 F | OXYGEN SATURATION: 98 % | SYSTOLIC BLOOD PRESSURE: 144 MMHG | DIASTOLIC BLOOD PRESSURE: 88 MMHG

## 2018-02-14 PROCEDURE — 96374 THER/PROPH/DIAG INJ IV PUSH: CPT

## 2018-02-14 PROCEDURE — 74011250636 HC RX REV CODE- 250/636

## 2018-02-14 PROCEDURE — 74011250636 HC RX REV CODE- 250/636: Performed by: PODIATRIST

## 2018-02-14 PROCEDURE — 74011000272 HC RX REV CODE- 272: Performed by: PODIATRIST

## 2018-02-14 RX ORDER — HEPARIN 100 UNIT/ML
SYRINGE INTRAVENOUS
Status: COMPLETED
Start: 2018-02-14 | End: 2018-02-14

## 2018-02-14 RX ORDER — SODIUM CHLORIDE 0.9 % (FLUSH) 0.9 %
10 SYRINGE (ML) INJECTION AS NEEDED
Status: DISCONTINUED | OUTPATIENT
Start: 2018-02-14 | End: 2018-02-18 | Stop reason: HOSPADM

## 2018-02-14 RX ORDER — HEPARIN SODIUM (PORCINE) LOCK FLUSH IV SOLN 100 UNIT/ML 100 UNIT/ML
500 SOLUTION INTRAVENOUS AS NEEDED
Status: DISPENSED | OUTPATIENT
Start: 2018-02-14 | End: 2018-02-15

## 2018-02-14 RX ADMIN — DAPTOMYCIN 500 MG: 500 INJECTION, POWDER, LYOPHILIZED, FOR SOLUTION INTRAVENOUS at 10:55

## 2018-02-14 RX ADMIN — HEPARIN 500 UNITS: 100 SYRINGE at 10:59

## 2018-02-14 RX ADMIN — Medication 10 ML: at 10:53

## 2018-02-14 RX ADMIN — Medication 10 ML: at 10:54

## 2018-02-14 NOTE — PROGRESS NOTES
[facility] Progress Note    Date: 2018    Name: Alice Wright    MRN: [id number]         : 1960     IV Antibiotics    Mr. Eric Baez was assessed and education was provided. Pt denies any reaction or complications,     Mr. Uriah Díaz vitals were reviewed. Visit Vitals    /88 (BP 1 Location: Left arm, BP Patient Position: Sitting)    Pulse 94    Temp 98.2 °F (36.8 °C)    Resp 18    SpO2 98%             []  Vancomycin     []  Invanz     [x]  Cubicin 500 mg IV push     []  Rocephin    was infused  Over 2 minutes per orders without complications. PICC line flushed with 5 ml normal saline in bilateral lumens, the instilled 250 units heparin in bilateral lumens, covered with curos caps, appled 4x4 and secured with paper tape and a stockinette. Mr. Eric Baez tolerated without complications of   Patient armband removed and shredded. Mr. Eric Baez was discharged from [place] [address] in stable condition at 1100. He is to return on 2/15/18 for his next appointment.     Jimbo Martinez RN  2018  3:29 PM

## 2018-02-15 ENCOUNTER — HOSPITAL ENCOUNTER (OUTPATIENT)
Dept: INFUSION THERAPY | Age: 58
Discharge: HOME | End: 2018-02-15
Payer: COMMERCIAL

## 2018-02-15 VITALS
RESPIRATION RATE: 18 BRPM | DIASTOLIC BLOOD PRESSURE: 84 MMHG | HEART RATE: 86 BPM | TEMPERATURE: 97.1 F | SYSTOLIC BLOOD PRESSURE: 147 MMHG | OXYGEN SATURATION: 99 %

## 2018-02-15 PROCEDURE — 96374 THER/PROPH/DIAG INJ IV PUSH: CPT

## 2018-02-15 PROCEDURE — 74011250636 HC RX REV CODE- 250/636: Performed by: PODIATRIST

## 2018-02-15 PROCEDURE — 74011000272 HC RX REV CODE- 272: Performed by: PODIATRIST

## 2018-02-15 RX ORDER — HEPARIN 100 UNIT/ML
500 SYRINGE INTRAVENOUS ONCE
Status: COMPLETED | OUTPATIENT
Start: 2018-02-15 | End: 2018-02-15

## 2018-02-15 RX ORDER — SODIUM CHLORIDE 0.9 % (FLUSH) 0.9 %
5-10 SYRINGE (ML) INJECTION AS NEEDED
Status: DISCONTINUED | OUTPATIENT
Start: 2018-02-15 | End: 2018-02-19 | Stop reason: HOSPADM

## 2018-02-15 RX ADMIN — Medication 10 ML: at 10:58

## 2018-02-15 RX ADMIN — HEPARIN 500 UNITS: 100 SYRINGE at 10:58

## 2018-02-15 RX ADMIN — DAPTOMYCIN 500 MG: 500 INJECTION, POWDER, LYOPHILIZED, FOR SOLUTION INTRAVENOUS at 10:55

## 2018-02-15 NOTE — PROGRESS NOTES
[facility] Progress Note    Date: February 15, 2018    Name: Radha Jensen    MRN: [id number]         : 1960     IV Antibiotics    Mr. Taran Hooker was assessed and education was provided. Mr. Stalin Soria vitals were reviewed. Visit Vitals    /84 (BP 1 Location: Left arm, BP Patient Position: Sitting)    Pulse 86    Temp 97.1 °F (36.2 °C)    Resp 18    SpO2 99%       Lab results were obtained and reviewed. No results found for this or any previous visit (from the past 12 hour(s)). []  Vancomycin     []  Invanz     [x]  Cubicin 500 mg IV push     []  Rocephin    was infused per orders without complications. Mr. Taran Hooker tolerated infusion, and had no complaints at this time. Patient armband removed and shredded. Mr. Taran Hooker was discharged from [place] [address] in stable condition at 1100. He is to return on 18 for his next appointment. Armband removed and shredded.      Deandre Gonsales RN  February 15, 2018  12:34 PM

## 2018-02-16 ENCOUNTER — HOSPITAL ENCOUNTER (OUTPATIENT)
Dept: INFUSION THERAPY | Age: 58
Discharge: HOME | End: 2018-02-16
Payer: COMMERCIAL

## 2018-02-16 VITALS
RESPIRATION RATE: 18 BRPM | DIASTOLIC BLOOD PRESSURE: 83 MMHG | SYSTOLIC BLOOD PRESSURE: 143 MMHG | TEMPERATURE: 97 F | HEART RATE: 93 BPM | OXYGEN SATURATION: 99 %

## 2018-02-16 PROCEDURE — 74011250636 HC RX REV CODE- 250/636: Performed by: PODIATRIST

## 2018-02-16 PROCEDURE — 74011000272 HC RX REV CODE- 272: Performed by: PODIATRIST

## 2018-02-16 PROCEDURE — 96374 THER/PROPH/DIAG INJ IV PUSH: CPT

## 2018-02-16 RX ORDER — SODIUM CHLORIDE 0.9 % (FLUSH) 0.9 %
5-10 SYRINGE (ML) INJECTION AS NEEDED
Status: DISCONTINUED | OUTPATIENT
Start: 2018-02-16 | End: 2018-02-20 | Stop reason: HOSPADM

## 2018-02-16 RX ORDER — HEPARIN 100 UNIT/ML
500 SYRINGE INTRAVENOUS ONCE
Status: COMPLETED | OUTPATIENT
Start: 2018-02-16 | End: 2018-02-16

## 2018-02-16 RX ADMIN — DAPTOMYCIN 500 MG: 500 INJECTION, POWDER, LYOPHILIZED, FOR SOLUTION INTRAVENOUS at 13:34

## 2018-02-16 RX ADMIN — HEPARIN 500 UNITS: 100 SYRINGE at 13:37

## 2018-02-16 RX ADMIN — Medication 10 ML: at 13:37

## 2018-02-16 NOTE — PROGRESS NOTES
[facility] Progress Note    Date: 2018    Name: Memo Alexandra    MRN: [id number]         : 1960     IV Antibiotics    Mr. Layla Caro was assessed and education was provided. Mr. Layla Caro stated he saw physician for follow up and was told he would be continuing antibiotics for at least another week. Mr. Niya Parada vitals were reviewed. Visit Vitals    /83 (BP 1 Location: Left arm, BP Patient Position: Sitting)    Pulse 93    Temp 97 °F (36.1 °C)    Resp 18    SpO2 99%       Lab results were obtained and reviewed. No results found for this or any previous visit (from the past 12 hour(s)). []  Vancomycin     []  Invanz     [x]  Cubicin 500 mg IV push     []  Rocephin    was infused per orders without complications. Mr. Layla Caro tolerated infusion, and had no complaints at this time. Patient armband removed and shredded. Mr. Layla Caro was discharged from [place] [address] in stable condition at 1340. He is to return on 18 for his next appointment.     Cyn Nelson RN  2018  2:09 PM

## 2018-02-17 ENCOUNTER — HOSPITAL ENCOUNTER (OUTPATIENT)
Dept: INFUSION THERAPY | Age: 58
Discharge: HOME | End: 2018-02-17
Payer: COMMERCIAL

## 2018-02-17 VITALS
TEMPERATURE: 97.5 F | OXYGEN SATURATION: 99 % | RESPIRATION RATE: 18 BRPM | SYSTOLIC BLOOD PRESSURE: 157 MMHG | DIASTOLIC BLOOD PRESSURE: 83 MMHG | HEART RATE: 85 BPM

## 2018-02-17 PROCEDURE — 74011000272 HC RX REV CODE- 272: Performed by: PODIATRIST

## 2018-02-17 PROCEDURE — 96374 THER/PROPH/DIAG INJ IV PUSH: CPT

## 2018-02-17 PROCEDURE — 74011250636 HC RX REV CODE- 250/636: Performed by: PODIATRIST

## 2018-02-17 RX ORDER — HEPARIN 100 UNIT/ML
500 SYRINGE INTRAVENOUS ONCE
Status: COMPLETED | OUTPATIENT
Start: 2018-02-17 | End: 2018-02-17

## 2018-02-17 RX ORDER — SODIUM CHLORIDE 0.9 % (FLUSH) 0.9 %
10-40 SYRINGE (ML) INJECTION AS NEEDED
Status: DISCONTINUED | OUTPATIENT
Start: 2018-02-17 | End: 2018-02-21 | Stop reason: HOSPADM

## 2018-02-17 RX ADMIN — Medication 30 ML: at 11:00

## 2018-02-17 RX ADMIN — HEPARIN 500 UNITS: 100 SYRINGE at 11:00

## 2018-02-17 RX ADMIN — DAPTOMYCIN 500 MG: 500 INJECTION, POWDER, LYOPHILIZED, FOR SOLUTION INTRAVENOUS at 10:56

## 2018-02-17 NOTE — PROGRESS NOTES
[facility] Progress Note    Date: 2018    Name: Cheral Pallas    MRN: [id number]         : 1960    Cubicin Infusion    Mr. Dav Peter to [place], ambulatory at 1040 . Pt was assessed and education was provided. Mr. Kali Michel vitals were reviewed. Visit Vitals    /83 (BP 1 Location: Left arm, BP Patient Position: Sitting)    Pulse 85    Temp 97.5 °F (36.4 °C)    Resp 18    SpO2 99%       Right upper arm PICC flushed easily and had brisk blood return via both ports. PICC dressing c/d/i and not due to be changed. No swelling, redness, streaking, warmth or drainage noted in arm. Pt denied c/o pain in arm.      []  Vancomycin     []  Invanz     [x]  Cubicin 500 mg     []  Rocephin   Was infused slowly over approximately 2 minutes. Mr. Dav Peter tolerated infusion, and had no complaints at this time. Both lumens of PICC flushed with NS 10 ml and Heparin 250 units. Green end caps applied, and lumens wrapped with guaze and paper tape. Stockinette placed over dressing for protection. Patient armband removed and shredded. Mr. Dav Peter was discharged from [place] [address] in stable condition at 1105. He is to return on 18 at 0800 for his next antibiotic appointment.     Jolene Zheng RN  2018

## 2018-02-18 ENCOUNTER — HOSPITAL ENCOUNTER (OUTPATIENT)
Dept: INFUSION THERAPY | Age: 58
Discharge: HOME | End: 2018-02-18
Payer: COMMERCIAL

## 2018-02-18 VITALS
TEMPERATURE: 98.1 F | OXYGEN SATURATION: 97 % | HEART RATE: 79 BPM | SYSTOLIC BLOOD PRESSURE: 147 MMHG | DIASTOLIC BLOOD PRESSURE: 78 MMHG | RESPIRATION RATE: 18 BRPM

## 2018-02-18 PROCEDURE — 74011000272 HC RX REV CODE- 272: Performed by: PODIATRIST

## 2018-02-18 PROCEDURE — 74011250636 HC RX REV CODE- 250/636: Performed by: PODIATRIST

## 2018-02-18 PROCEDURE — 77030020847 HC STATLOK BARD -A

## 2018-02-18 PROCEDURE — 96374 THER/PROPH/DIAG INJ IV PUSH: CPT

## 2018-02-18 RX ORDER — HEPARIN 100 UNIT/ML
SYRINGE INTRAVENOUS
Status: DISPENSED
Start: 2018-02-18 | End: 2018-02-18

## 2018-02-18 RX ORDER — SODIUM CHLORIDE 0.9 % (FLUSH) 0.9 %
10-40 SYRINGE (ML) INJECTION AS NEEDED
Status: DISCONTINUED | OUTPATIENT
Start: 2018-02-18 | End: 2018-02-22 | Stop reason: HOSPADM

## 2018-02-18 RX ORDER — HEPARIN 100 UNIT/ML
500 SYRINGE INTRAVENOUS ONCE
Status: COMPLETED | OUTPATIENT
Start: 2018-02-18 | End: 2018-02-18

## 2018-02-18 RX ADMIN — HEPARIN 500 UNITS: 100 SYRINGE at 07:59

## 2018-02-18 RX ADMIN — DAPTOMYCIN 500 MG: 500 INJECTION, POWDER, LYOPHILIZED, FOR SOLUTION INTRAVENOUS at 07:58

## 2018-02-18 RX ADMIN — Medication 30 ML: at 07:59

## 2018-02-18 NOTE — PROGRESS NOTES
[facility]    Date: 2018    Name: Jacqueline Pham    MRN: [id number]         : 1960    Cubicin Infusion    Mr. Giovany Schmidt to [facility], ambulatory at 0750 . Pt was assessed and education was provided. Mr. Tyshawn Ramos vitals were reviewed. Visit Vitals    /78 (BP 1 Location: Left arm)    Pulse 79    Temp 98.1 °F (36.7 °C)    Resp 18    SpO2 97%       Right upper arm PICC flushed easily and had brisk blood return via both ports.          []  Vancomycin     []  Invanz     [x]  Cubicin 500 mg     []  Rocephin   Was infused slowly over approximately 2 minutes. PICC dressing and stat lock changed per protocol. Biopatch and skin protectant applied. No redness, streaking, warmth, or drainage noted at site. Mr. Giovany Schmidt tolerated infusion, and had no complaints at this time. Both lumens of PICC flushed with NS 10 ml and Heparin 250 units. Microclaves changed, green end caps applied, and lumens wrapped with guaze and paper tape. Stockinette placed over dressing for protection. Patient armband removed and shredded. Mr. Giovany Schmidt was discharged from [place] [address] in stable condition at . [address]. He is to return on 18 at 1300 for his next antibiotic appointment.     Jacob Carlisle RN  2018

## 2018-02-19 ENCOUNTER — HOSPITAL ENCOUNTER (OUTPATIENT)
Dept: INFUSION THERAPY | Age: 58
Discharge: HOME | End: 2018-02-19
Payer: COMMERCIAL

## 2018-02-19 VITALS
TEMPERATURE: 99.3 F | DIASTOLIC BLOOD PRESSURE: 80 MMHG | OXYGEN SATURATION: 98 % | HEART RATE: 82 BPM | SYSTOLIC BLOOD PRESSURE: 145 MMHG | RESPIRATION RATE: 18 BRPM

## 2018-02-19 LAB
CRP SERPL-MCNC: <0.3 MG/DL (ref 0–0.3)
ERYTHROCYTE [SEDIMENTATION RATE] IN BLOOD: 4 MM/HR (ref 0–20)

## 2018-02-19 PROCEDURE — 36415 COLL VENOUS BLD VENIPUNCTURE: CPT | Performed by: PODIATRIST

## 2018-02-19 PROCEDURE — 74011250636 HC RX REV CODE- 250/636: Performed by: PODIATRIST

## 2018-02-19 PROCEDURE — 86140 C-REACTIVE PROTEIN: CPT | Performed by: PODIATRIST

## 2018-02-19 PROCEDURE — 74011000272 HC RX REV CODE- 272: Performed by: PODIATRIST

## 2018-02-19 PROCEDURE — 85652 RBC SED RATE AUTOMATED: CPT | Performed by: PODIATRIST

## 2018-02-19 PROCEDURE — 96374 THER/PROPH/DIAG INJ IV PUSH: CPT

## 2018-02-19 PROCEDURE — 74011250636 HC RX REV CODE- 250/636

## 2018-02-19 RX ORDER — HEPARIN 100 UNIT/ML
SYRINGE INTRAVENOUS
Status: COMPLETED
Start: 2018-02-19 | End: 2018-02-19

## 2018-02-19 RX ORDER — HEPARIN 100 UNIT/ML
500 SYRINGE INTRAVENOUS AS NEEDED
Status: ACTIVE | OUTPATIENT
Start: 2018-02-19 | End: 2018-02-20

## 2018-02-19 RX ORDER — SODIUM CHLORIDE 0.9 % (FLUSH) 0.9 %
10 SYRINGE (ML) INJECTION AS NEEDED
Status: DISCONTINUED | OUTPATIENT
Start: 2018-02-19 | End: 2018-02-23 | Stop reason: HOSPADM

## 2018-02-19 RX ADMIN — Medication 10 ML: at 12:49

## 2018-02-19 RX ADMIN — DAPTOMYCIN 500 MG: 500 INJECTION, POWDER, LYOPHILIZED, FOR SOLUTION INTRAVENOUS at 12:47

## 2018-02-19 RX ADMIN — HEPARIN 500 UNITS: 100 SYRINGE at 12:52

## 2018-02-19 RX ADMIN — HEPARIN 500 UNITS: 100 SYRINGE at 12:50

## 2018-02-19 NOTE — PROGRESS NOTES
[facility] Progress Note    Date: 2018    Name: Pedro Waters    MRN: [id number]         : 1960     IV Antibiotics    Mr. Del Dewitt was assessed and education was provided. Pt denies any reaction or complications,     Mr. Katy Hollins vitals were reviewed. Visit Vitals    /80 (BP 1 Location: Left arm)    Pulse 82    Temp 99.3 °F (37.4 °C)    Resp 18    SpO2 98%       Routine labs collected via red lumen,  (see [facility] for labs )      []  Vancomycin     []  Invanz     [x]  Cubicin 500 mg IV push     []  Rocephin    was infused  Over 2 minutes per orders without complications. PICC line flushed with 5 ml normal saline in bilateral lumens, the instilled 250 units heparin in bilateral lumens, covered with curos caps, appled 4x4 and secured with paper tape and a stockinette. Mr. Del Dewitt tolerated without complications of   Patient armband removed and shredded. Mr. Del Dewitt was discharged from [place] [address] in stable condition at [phone]. He is to return on 18 for his next appointment.     Elaine Manning RN  2018  3:29 PM

## 2018-02-20 ENCOUNTER — HOSPITAL ENCOUNTER (OUTPATIENT)
Dept: INFUSION THERAPY | Age: 58
Discharge: HOME | End: 2018-02-20
Payer: COMMERCIAL

## 2018-02-20 VITALS
RESPIRATION RATE: 18 BRPM | HEART RATE: 92 BPM | TEMPERATURE: 98 F | SYSTOLIC BLOOD PRESSURE: 155 MMHG | OXYGEN SATURATION: 97 % | DIASTOLIC BLOOD PRESSURE: 84 MMHG

## 2018-02-20 PROCEDURE — 74011250636 HC RX REV CODE- 250/636: Performed by: PODIATRIST

## 2018-02-20 PROCEDURE — 74011000272 HC RX REV CODE- 272: Performed by: PODIATRIST

## 2018-02-20 PROCEDURE — 96374 THER/PROPH/DIAG INJ IV PUSH: CPT

## 2018-02-20 RX ORDER — SODIUM CHLORIDE 0.9 % (FLUSH) 0.9 %
10 SYRINGE (ML) INJECTION AS NEEDED
Status: DISCONTINUED | OUTPATIENT
Start: 2018-02-20 | End: 2018-02-24 | Stop reason: HOSPADM

## 2018-02-20 RX ORDER — HEPARIN 100 UNIT/ML
500 SYRINGE INTRAVENOUS ONCE
Status: COMPLETED | OUTPATIENT
Start: 2018-02-20 | End: 2018-02-20

## 2018-02-20 RX ADMIN — DAPTOMYCIN 500 MG: 500 INJECTION, POWDER, LYOPHILIZED, FOR SOLUTION INTRAVENOUS at 12:52

## 2018-02-20 RX ADMIN — Medication 10 ML: at 12:50

## 2018-02-20 RX ADMIN — Medication 10 ML: at 12:54

## 2018-02-20 RX ADMIN — HEPARIN 500 UNITS: 100 SYRINGE at 12:54

## 2018-02-20 NOTE — PROGRESS NOTES
[facility] OPIC Progress Note    Date: 2018    Name: Alice Wright    MRN: [id number]         : 1960      Mr. Eric Baez arrived to [facility] at 200. Mr. Eric Baez was assessed and education was provided. Mr. Eric Baez denied any new complaints today. Mr. Uriah Díaz vitals were reviewed. Visit Vitals    /84 (BP 1 Location: Left arm, BP Patient Position: Sitting)    Pulse 92    Temp 98 °F (36.7 °C)    Resp 18    SpO2 97%       Pt with Right upper arm double lumen PICC line. Each lumen flushes without difficulty and positive for blood return. Dressing CDI. No redness, bruising, or swelling noted at site and/ or extremity. Pt denies tenderness. Daptomycin 500 mg was administered as ordered via purple lumen followed by normal saline flush. Mr. Eric Baez tolerated well without complaints. Flushed each lumen of pt's PICC line with heparin per order. New curos caps applied to the end of each lumen. Lumens wrapped in gauze and paper tape. Mr. Eric Baez was discharged from [place] [address] in stable condition at 1300. He is to return on 2018 at 1100 for his next appointment.     Arvind Vilchis RN  2018

## 2018-02-21 ENCOUNTER — HOSPITAL ENCOUNTER (OUTPATIENT)
Dept: INFUSION THERAPY | Age: 58
Discharge: HOME | End: 2018-02-21
Payer: COMMERCIAL

## 2018-02-21 VITALS
OXYGEN SATURATION: 98 % | RESPIRATION RATE: 18 BRPM | DIASTOLIC BLOOD PRESSURE: 86 MMHG | SYSTOLIC BLOOD PRESSURE: 140 MMHG | HEART RATE: 89 BPM | TEMPERATURE: 98.1 F

## 2018-02-21 PROCEDURE — 74011250636 HC RX REV CODE- 250/636: Performed by: PODIATRIST

## 2018-02-21 PROCEDURE — 74011000272 HC RX REV CODE- 272: Performed by: PODIATRIST

## 2018-02-21 PROCEDURE — 96374 THER/PROPH/DIAG INJ IV PUSH: CPT

## 2018-02-21 RX ORDER — SODIUM CHLORIDE 0.9 % (FLUSH) 0.9 %
10-40 SYRINGE (ML) INJECTION AS NEEDED
Status: DISCONTINUED | OUTPATIENT
Start: 2018-02-21 | End: 2018-02-25 | Stop reason: HOSPADM

## 2018-02-21 RX ORDER — HEPARIN 100 UNIT/ML
500 SYRINGE INTRAVENOUS ONCE
Status: COMPLETED | OUTPATIENT
Start: 2018-02-21 | End: 2018-02-21

## 2018-02-21 RX ADMIN — HEPARIN 500 UNITS: 100 SYRINGE at 11:28

## 2018-02-21 RX ADMIN — Medication 30 ML: at 11:27

## 2018-02-21 RX ADMIN — DAPTOMYCIN 500 MG: 500 INJECTION, POWDER, LYOPHILIZED, FOR SOLUTION INTRAVENOUS at 11:25

## 2018-02-21 NOTE — PROGRESS NOTES
[facility]    Date: 2018    Name: Lisa Kim    MRN: [id number]         : 1960    Cubicin Infusion    Mr. Morgan Farrell to [facility], ambulatory at 1120 . Pt was assessed and education was provided. Mr. Eve Thomas vitals were reviewed. Visit Vitals    /86 (BP 1 Location: Left arm, BP Patient Position: Sitting)    Pulse 89    Temp 98.1 °F (36.7 °C)    Resp 18    SpO2 98%       Right upper arm PICC flushed easily and had brisk blood return via both ports. PICC dressing c/d/i and not due to be changed. No swelling, redness, streaking, warmth or drainage noted in arm. Pt denied c/o pain in arm.      []  Vancomycin     []  Invanz     [x]  Cubicin 500 mg     []  Rocephin    was infused slowly over 2 minutes. Mr. Morgan Farrell tolerated infusion, and had no complaints at this time. Both lumens of PICC flushed with NS 10 ml and Heparin 250 units. Green end caps applied, and lumens wrapped with guaze and paper tape. Stockinette placed over dressing for protection. Patient armband removed and shredded. Mr. Morgan Farrell was discharged from [place] [address] in stable condition at 1130. He is to return on 18 at 1000 for his next antibiotic appointment.     Jame Garcia RN  2018

## 2018-02-22 ENCOUNTER — HOSPITAL ENCOUNTER (OUTPATIENT)
Dept: INFUSION THERAPY | Age: 58
Discharge: HOME | End: 2018-02-22
Payer: COMMERCIAL

## 2018-02-22 VITALS
DIASTOLIC BLOOD PRESSURE: 73 MMHG | OXYGEN SATURATION: 98 % | HEART RATE: 85 BPM | TEMPERATURE: 97.7 F | RESPIRATION RATE: 18 BRPM | SYSTOLIC BLOOD PRESSURE: 147 MMHG

## 2018-02-22 PROCEDURE — 96374 THER/PROPH/DIAG INJ IV PUSH: CPT

## 2018-02-22 PROCEDURE — 74011250636 HC RX REV CODE- 250/636: Performed by: PODIATRIST

## 2018-02-22 PROCEDURE — 74011000272 HC RX REV CODE- 272: Performed by: PODIATRIST

## 2018-02-22 RX ORDER — HEPARIN 100 UNIT/ML
500 SYRINGE INTRAVENOUS ONCE
Status: COMPLETED | OUTPATIENT
Start: 2018-02-22 | End: 2018-02-22

## 2018-02-22 RX ORDER — SODIUM CHLORIDE 0.9 % (FLUSH) 0.9 %
10-40 SYRINGE (ML) INJECTION AS NEEDED
Status: DISCONTINUED | OUTPATIENT
Start: 2018-02-22 | End: 2018-02-26 | Stop reason: HOSPADM

## 2018-02-22 RX ORDER — HEPARIN 100 UNIT/ML
SYRINGE INTRAVENOUS
Status: DISPENSED
Start: 2018-02-22 | End: 2018-02-22

## 2018-02-22 RX ADMIN — DAPTOMYCIN 500 MG: 500 INJECTION, POWDER, LYOPHILIZED, FOR SOLUTION INTRAVENOUS at 09:48

## 2018-02-22 RX ADMIN — HEPARIN 500 UNITS: 100 SYRINGE at 09:53

## 2018-02-22 RX ADMIN — Medication 30 ML: at 09:53

## 2018-02-22 NOTE — PROGRESS NOTES
[facility] Progress Note    Date: 2018    Name: Pat Michelle    MRN: [id number]         : 1960    Cubicin Infusion    Mr. Mao Hameed to [facility], ambulatory at  accompanied by 0940. Pt was assessed and education was provided. Mr. Aleen Opitz vitals were reviewed. Visit Vitals    /73 (BP 1 Location: Left arm, BP Patient Position: Sitting)    Pulse 85    Temp 97.7 °F (36.5 °C)    Resp 18    SpO2 98%       Right upper arm PICC flushed easily and had brisk blood return via both ports. PICC dressing c/d/i and not due to be changed. No swelling, redness, streaking, warmth or drainage noted in arm. Pt denied c/o pain in arm.      []  Vancomycin     []  Invanz     [x]  Cubicin 500 mg       []  Rocephin    was infused over two minutes. Mr. Mao Hameed tolerated infusion, and had no complaints at this time. Both lumens of PICC flushed with NS 10 ml and Heparin 250 units. Green end caps applied, and lumens wrapped with guaze and paper tape. Stockinette placed over dressing for protection. Patient armband removed and shredded. Mr. Mao Hameed was discharged from [place] [address] in stable condition at [phone]. He is to return on 18 at 1300 for his next antibiotic appointment.     Frieda No RN  2018

## 2018-02-23 ENCOUNTER — HOSPITAL ENCOUNTER (OUTPATIENT)
Dept: INFUSION THERAPY | Age: 58
Discharge: HOME | End: 2018-02-23
Payer: COMMERCIAL

## 2018-02-23 VITALS
SYSTOLIC BLOOD PRESSURE: 148 MMHG | HEART RATE: 80 BPM | DIASTOLIC BLOOD PRESSURE: 80 MMHG | TEMPERATURE: 97.9 F | OXYGEN SATURATION: 100 % | RESPIRATION RATE: 18 BRPM

## 2018-02-23 PROCEDURE — 96374 THER/PROPH/DIAG INJ IV PUSH: CPT

## 2018-02-23 PROCEDURE — 74011000272 HC RX REV CODE- 272: Performed by: PODIATRIST

## 2018-02-23 PROCEDURE — 74011250636 HC RX REV CODE- 250/636: Performed by: PODIATRIST

## 2018-02-23 RX ORDER — HEPARIN 100 UNIT/ML
500 SYRINGE INTRAVENOUS ONCE
Status: COMPLETED | OUTPATIENT
Start: 2018-02-23 | End: 2018-02-23

## 2018-02-23 RX ORDER — HEPARIN 100 UNIT/ML
SYRINGE INTRAVENOUS
Status: DISPENSED
Start: 2018-02-23 | End: 2018-02-24

## 2018-02-23 RX ORDER — SODIUM CHLORIDE 0.9 % (FLUSH) 0.9 %
10-40 SYRINGE (ML) INJECTION AS NEEDED
Status: DISCONTINUED | OUTPATIENT
Start: 2018-02-23 | End: 2018-02-27 | Stop reason: HOSPADM

## 2018-02-23 RX ADMIN — HEPARIN 500 UNITS: 100 SYRINGE at 13:11

## 2018-02-23 RX ADMIN — Medication 30 ML: at 13:11

## 2018-02-23 RX ADMIN — DAPTOMYCIN 500 MG: 500 INJECTION, POWDER, LYOPHILIZED, FOR SOLUTION INTRAVENOUS at 13:08

## 2018-02-23 NOTE — PROGRESS NOTES
[facility] Progress Note    Date: 2018    Name: Lisa Kim    MRN: [id number]         : 1960    Cubicin Infusion    Mr. Morgan Farrell to [facility], ambulatory at 1300. Derrick Serna Pt was assessed and education was provided. Mr. Eve Thomas vitals were reviewed. Visit Vitals    /80 (BP 1 Location: Left arm, BP Patient Position: Sitting)    Pulse 80    Temp 97.9 °F (36.6 °C)    Resp 18    SpO2 100%       Right upper arm PICC flushed easily and had brisk blood return via both ports. PICC dressing c/d/i and not due to be changed. No swelling, redness, streaking, warmth or drainage noted in arm. Pt denied c/o pain in arm.      []  Vancomycin     []  Invanz     [x]  Cubicin 500 mg     []  Rocephin   Was infused slowly over 2 minutes. PICC dressing and stat lock changed per protocol. Biopatch and skin protectant applied. No redness, streaking, warmth, or drainage noted at site. Mr. Morgan Farrell tolerated infusion, and had no complaints at this time. Both lumens of PICC flushed with NS 10 ml and Heparin 250 units. Green end caps applied, and lumens wrapped with guaze and paper tape. Stockinette placed over dressing for protection. Patient armband removed and shredded. Mr. Morgan Farrell was discharged from [place] [address] in stable condition at 1315. He is to return on 18 at 0900 for his next antibiotic appointment.     Jame Garcia RN  2018

## 2018-02-24 ENCOUNTER — HOSPITAL ENCOUNTER (OUTPATIENT)
Dept: INFUSION THERAPY | Age: 58
Discharge: HOME | End: 2018-02-24
Payer: COMMERCIAL

## 2018-02-24 VITALS
HEART RATE: 79 BPM | SYSTOLIC BLOOD PRESSURE: 143 MMHG | DIASTOLIC BLOOD PRESSURE: 77 MMHG | RESPIRATION RATE: 18 BRPM | TEMPERATURE: 97.8 F | OXYGEN SATURATION: 98 %

## 2018-02-24 PROCEDURE — 74011250636 HC RX REV CODE- 250/636: Performed by: PODIATRIST

## 2018-02-24 PROCEDURE — 96374 THER/PROPH/DIAG INJ IV PUSH: CPT

## 2018-02-24 PROCEDURE — 74011000272 HC RX REV CODE- 272: Performed by: PODIATRIST

## 2018-02-24 RX ORDER — HEPARIN 100 UNIT/ML
500 SYRINGE INTRAVENOUS ONCE
Status: COMPLETED | OUTPATIENT
Start: 2018-02-24 | End: 2018-02-24

## 2018-02-24 RX ORDER — SODIUM CHLORIDE 0.9 % (FLUSH) 0.9 %
5-10 SYRINGE (ML) INJECTION AS NEEDED
Status: DISCONTINUED | OUTPATIENT
Start: 2018-02-24 | End: 2018-02-28 | Stop reason: HOSPADM

## 2018-02-24 RX ADMIN — Medication 10 ML: at 08:31

## 2018-02-24 RX ADMIN — HEPARIN 500 UNITS: 100 SYRINGE at 08:32

## 2018-02-24 RX ADMIN — DAPTOMYCIN 500 MG: 500 INJECTION, POWDER, LYOPHILIZED, FOR SOLUTION INTRAVENOUS at 08:28

## 2018-02-24 NOTE — PROGRESS NOTES
[facility] Progress Note    Date: 2018    Name: Cheri Cabot    MRN: [id number]         : 1960     IV Antibiotics     Mr. Abida Ferraro was assessed and education was provided. Mr. Adolfo Colon vitals were reviewed. Visit Vitals    /77 (BP 1 Location: Left arm, BP Patient Position: Sitting)    Pulse 79    Temp 97.8 °F (36.6 °C)    Resp 18    SpO2 98%       Lab results were obtained and reviewed. No results found for this or any previous visit (from the past 12 hour(s)). []  Vancomycin     []  Invanz     [x]  Cubicin 500 mg IV push     []  Rocephin    was infused per orders without complications. Mr. Abida Ferraro tolerated infusion, and had no complaints at this time. Patient armband removed and shredded. Mr. Abida Ferraro was discharged from [place] [address] in stable condition at 5675. He is to return on 18 for his next appointment.     Maame Velázquez RN  2018  8:38 AM

## 2018-02-25 ENCOUNTER — HOSPITAL ENCOUNTER (OUTPATIENT)
Dept: INFUSION THERAPY | Age: 58
Discharge: HOME | End: 2018-02-25
Payer: COMMERCIAL

## 2018-02-25 VITALS
DIASTOLIC BLOOD PRESSURE: 90 MMHG | SYSTOLIC BLOOD PRESSURE: 150 MMHG | RESPIRATION RATE: 18 BRPM | HEART RATE: 76 BPM | TEMPERATURE: 97.4 F | OXYGEN SATURATION: 98 %

## 2018-02-25 PROCEDURE — 96374 THER/PROPH/DIAG INJ IV PUSH: CPT

## 2018-02-25 PROCEDURE — 74011250636 HC RX REV CODE- 250/636

## 2018-02-25 PROCEDURE — 74011250636 HC RX REV CODE- 250/636: Performed by: PODIATRIST

## 2018-02-25 PROCEDURE — 74011000272 HC RX REV CODE- 272: Performed by: PODIATRIST

## 2018-02-25 RX ORDER — HEPARIN 100 UNIT/ML
SYRINGE INTRAVENOUS
Status: COMPLETED
Start: 2018-02-25 | End: 2018-02-25

## 2018-02-25 RX ADMIN — HEPARIN 500 UNITS: 100 SYRINGE at 08:48

## 2018-02-25 RX ADMIN — DAPTOMYCIN 500 MG: 500 INJECTION, POWDER, LYOPHILIZED, FOR SOLUTION INTRAVENOUS at 08:45

## 2018-02-25 NOTE — PROGRESS NOTES
[facility] Progress Note    Date: 2018    Name: Elzbieta Suggs    MRN: [id number]         : 1960     IV Antibiotics     Mr. Poornima Taylor was assessed and education was provided. Mr. Arley Oliva vitals were reviewed. Visit Vitals    /90 (BP 1 Location: Left arm, BP Patient Position: Sitting)    Pulse 76    Temp 97.4 °F (36.3 °C)    Resp 18    SpO2 98%             []  Vancomycin     []  Invanz     [x]  Cubicin 500 mg IV push     []  Rocephin    was infused over 2 minutes per orders without complications. Mr. Poornima [place] tolerated infusion, and had no complaints at this time. Patient armband removed and shredded. Flushed bilateral lumens with 5 ml normal saline then instilled 250 units Heparin, applied curos caps, then covered with 4x4 and paper tape, secured with stockinette    Mr. Poornima [place] was discharged from [place] [address] in stable condition at 0850. He is to return on 18 for his next appointment.     Jaziel Gastelum RN  2018  8:38 AM

## 2018-02-26 ENCOUNTER — HOSPITAL ENCOUNTER (OUTPATIENT)
Dept: INFUSION THERAPY | Age: 58
Discharge: HOME | End: 2018-02-26
Payer: COMMERCIAL

## 2018-02-26 VITALS
SYSTOLIC BLOOD PRESSURE: 148 MMHG | RESPIRATION RATE: 18 BRPM | DIASTOLIC BLOOD PRESSURE: 81 MMHG | TEMPERATURE: 97.9 F | OXYGEN SATURATION: 99 % | HEART RATE: 84 BPM

## 2018-02-26 LAB — ERYTHROCYTE [SEDIMENTATION RATE] IN BLOOD: 4 MM/HR (ref 0–20)

## 2018-02-26 PROCEDURE — 77030020847 HC STATLOK BARD -A

## 2018-02-26 PROCEDURE — 74011000272 HC RX REV CODE- 272: Performed by: PODIATRIST

## 2018-02-26 PROCEDURE — 74011250636 HC RX REV CODE- 250/636: Performed by: PODIATRIST

## 2018-02-26 PROCEDURE — 85652 RBC SED RATE AUTOMATED: CPT | Performed by: PODIATRIST

## 2018-02-26 PROCEDURE — 96374 THER/PROPH/DIAG INJ IV PUSH: CPT

## 2018-02-26 RX ORDER — HEPARIN 100 UNIT/ML
500 SYRINGE INTRAVENOUS ONCE
Status: COMPLETED | OUTPATIENT
Start: 2018-02-26 | End: 2018-02-26

## 2018-02-26 RX ORDER — SODIUM CHLORIDE 0.9 % (FLUSH) 0.9 %
10-40 SYRINGE (ML) INJECTION AS NEEDED
Status: DISCONTINUED | OUTPATIENT
Start: 2018-02-26 | End: 2018-03-02 | Stop reason: HOSPADM

## 2018-02-26 RX ADMIN — HEPARIN 500 UNITS: 100 SYRINGE at 09:48

## 2018-02-26 RX ADMIN — DAPTOMYCIN 500 MG: 500 INJECTION, POWDER, LYOPHILIZED, FOR SOLUTION INTRAVENOUS at 09:45

## 2018-02-26 RX ADMIN — Medication 30 ML: at 09:48

## 2018-02-26 NOTE — PROGRESS NOTES
[facility] Progress Note    Date: 2018    Name: Justin Gonzalez    MRN: [id number]         : 1960    Cubicin Infusion    Mr. Tawanda Wei to [facility], ambulatory at 0930  . Pt was assessed and education was provided. Mr. Josee Gill vitals were reviewed. Visit Vitals    /81 (BP 1 Location: Left arm, BP Patient Position: Sitting)    Pulse 84    Temp 97.9 °F (36.6 °C)    Resp 18    SpO2 99%         Right upper arm PICC flushed easily and had brisk blood return via both ports. Blood drawn off and sent to lab for sed rate after 10 ml waste per written orders. []  Vancomycin     []  Invanz     [x]  Cubicin 500 mg     []  Rocephin   Was infused slowly over approximately 2 minutes. PICC dressing and stat lock changed per protocol. Biopatch and skin protectant applied. No redness, streaking, warmth, or drainage noted at site. Mr. Tawanda Wei tolerated infusion, and had no complaints at this time. Both lumens of PICC flushed with NS 10 ml and Heparin 250 units. Microclaves changed, green end caps applied, and lumens wrapped with guaze and paper tape. Stockinette placed over dressing for protection. Patient armband removed and shredded. Mr. Tawanda Wei was discharged from [place] [address] in stable condition at 1010. He is to return on 10/27/18 at 1000 for his next antibiotic appointment.     Lary Cm RN  2018

## 2018-02-27 ENCOUNTER — HOSPITAL ENCOUNTER (OUTPATIENT)
Dept: INFUSION THERAPY | Age: 58
Discharge: HOME | End: 2018-02-27
Payer: COMMERCIAL

## 2018-02-27 VITALS
OXYGEN SATURATION: 99 % | DIASTOLIC BLOOD PRESSURE: 77 MMHG | HEART RATE: 79 BPM | RESPIRATION RATE: 18 BRPM | TEMPERATURE: 98.4 F | SYSTOLIC BLOOD PRESSURE: 139 MMHG

## 2018-02-27 PROCEDURE — 74011000272 HC RX REV CODE- 272: Performed by: PODIATRIST

## 2018-02-27 PROCEDURE — 74011250636 HC RX REV CODE- 250/636: Performed by: PODIATRIST

## 2018-02-27 PROCEDURE — 74011250636 HC RX REV CODE- 250/636

## 2018-02-27 PROCEDURE — 96374 THER/PROPH/DIAG INJ IV PUSH: CPT

## 2018-02-27 RX ORDER — HEPARIN 100 UNIT/ML
500 SYRINGE INTRAVENOUS ONCE
Status: ACTIVE | OUTPATIENT
Start: 2018-02-27 | End: 2018-02-27

## 2018-02-27 RX ORDER — SODIUM CHLORIDE 0.9 % (FLUSH) 0.9 %
10 SYRINGE (ML) INJECTION AS NEEDED
Status: DISCONTINUED | OUTPATIENT
Start: 2018-02-27 | End: 2018-03-03 | Stop reason: HOSPADM

## 2018-02-27 RX ORDER — HEPARIN 100 UNIT/ML
SYRINGE INTRAVENOUS
Status: COMPLETED
Start: 2018-02-27 | End: 2018-02-27

## 2018-02-27 RX ADMIN — HEPARIN 500 UNITS: 100 SYRINGE at 09:38

## 2018-02-27 RX ADMIN — Medication 10 ML: at 09:38

## 2018-02-27 RX ADMIN — DAPTOMYCIN 500 MG: 500 INJECTION, POWDER, LYOPHILIZED, FOR SOLUTION INTRAVENOUS at 09:36

## 2018-02-27 RX ADMIN — Medication 10 ML: at 09:35

## 2018-02-28 ENCOUNTER — HOSPITAL ENCOUNTER (OUTPATIENT)
Dept: INFUSION THERAPY | Age: 58
Discharge: HOME | End: 2018-02-28
Payer: COMMERCIAL

## 2018-02-28 VITALS
OXYGEN SATURATION: 100 % | HEART RATE: 87 BPM | RESPIRATION RATE: 18 BRPM | SYSTOLIC BLOOD PRESSURE: 150 MMHG | TEMPERATURE: 98.1 F | DIASTOLIC BLOOD PRESSURE: 52 MMHG

## 2018-02-28 PROCEDURE — 74011250636 HC RX REV CODE- 250/636: Performed by: PODIATRIST

## 2018-02-28 PROCEDURE — 74011000272 HC RX REV CODE- 272: Performed by: PODIATRIST

## 2018-02-28 PROCEDURE — 96374 THER/PROPH/DIAG INJ IV PUSH: CPT

## 2018-02-28 RX ORDER — SODIUM CHLORIDE 0.9 % (FLUSH) 0.9 %
10-40 SYRINGE (ML) INJECTION AS NEEDED
Status: DISCONTINUED | OUTPATIENT
Start: 2018-02-28 | End: 2018-03-04 | Stop reason: HOSPADM

## 2018-02-28 RX ORDER — HEPARIN 100 UNIT/ML
500 SYRINGE INTRAVENOUS ONCE
Status: COMPLETED | OUTPATIENT
Start: 2018-02-28 | End: 2018-02-28

## 2018-02-28 RX ADMIN — HEPARIN 500 UNITS: 100 SYRINGE at 11:06

## 2018-02-28 RX ADMIN — DAPTOMYCIN 500 MG: 500 INJECTION, POWDER, LYOPHILIZED, FOR SOLUTION INTRAVENOUS at 11:02

## 2018-02-28 RX ADMIN — Medication 30 ML: at 11:06

## 2018-02-28 NOTE — PROGRESS NOTES
[facility] Progress Note    Date: 2018    Name: Kyra Esquivel    MRN: [id number]         : 1960    Cubicin Infusion    Mr. Galina Garza to [facility], ambulatory at 1055. Pt was assessed and education was provided. Mr. Fely Osei vitals were reviewed. Visit Vitals    /52 (BP 1 Location: Left arm, BP Patient Position: Sitting)    Pulse 87    Temp 98.1 °F (36.7 °C)    Resp 18    SpO2 100%       Right upper arm PICC flushed easily and had brisk blood return via both ports. PICC dressing c/d/i and not due to be changed. No swelling, redness, streaking, warmth or drainage noted in arm. Pt denied c/o pain in arm.      []  Vancomycin     []  Invanz     [x]  Cubicin 500 mg     []  Rocephin    was infused over approximately 30 minutes. Mr. Galina Garza tolerated infusion, and had no complaints at this time. Both lumens of PICC flushed with NS 10 ml and Heparin 250 units. Green end caps applied, and lumens wrapped with guaze and paper tape. Stockinette placed over dressing for protection. Patient armband removed and shredded. Mr. Galina Garza was discharged from [place] [address] in stable condition at 1110. He is to return on 3/1/18 for his next antibiotic appointment.     Leigh Hawk RN  2018

## 2018-03-01 ENCOUNTER — HOSPITAL ENCOUNTER (OUTPATIENT)
Dept: INFUSION THERAPY | Age: 58
Discharge: HOME | End: 2018-03-01
Payer: COMMERCIAL

## 2018-03-01 VITALS
RESPIRATION RATE: 18 BRPM | OXYGEN SATURATION: 99 % | SYSTOLIC BLOOD PRESSURE: 134 MMHG | DIASTOLIC BLOOD PRESSURE: 78 MMHG | HEART RATE: 84 BPM | TEMPERATURE: 97.8 F

## 2018-03-01 PROCEDURE — 74011000272 HC RX REV CODE- 272: Performed by: PODIATRIST

## 2018-03-01 PROCEDURE — 96374 THER/PROPH/DIAG INJ IV PUSH: CPT

## 2018-03-01 PROCEDURE — 74011250636 HC RX REV CODE- 250/636: Performed by: PODIATRIST

## 2018-03-01 RX ORDER — HEPARIN 100 UNIT/ML
500 SYRINGE INTRAVENOUS ONCE
Status: COMPLETED | OUTPATIENT
Start: 2018-03-01 | End: 2018-03-01

## 2018-03-01 RX ORDER — SODIUM CHLORIDE 0.9 % (FLUSH) 0.9 %
5-10 SYRINGE (ML) INJECTION AS NEEDED
Status: DISCONTINUED | OUTPATIENT
Start: 2018-03-01 | End: 2018-03-05 | Stop reason: HOSPADM

## 2018-03-01 RX ADMIN — Medication 10 ML: at 10:45

## 2018-03-01 RX ADMIN — DAPTOMYCIN 500 MG: 500 INJECTION, POWDER, LYOPHILIZED, FOR SOLUTION INTRAVENOUS at 10:43

## 2018-03-01 RX ADMIN — HEPARIN 500 UNITS: 100 SYRINGE at 10:46

## 2018-03-01 NOTE — PROGRESS NOTES
[facility] Progress Note    Date: 2018    Name: Kike Augustin    MRN: [id number]         : 1960    Mr. Cesar Connor was assessed and education was provided. Mr. Jerman Starks vitals were reviewed. Visit Vitals    /78 (BP 1 Location: Left arm, BP Patient Position: Sitting)    Pulse 84    Temp 97.8 °F (36.6 °C)    Resp 18    SpO2 99%       Lab results were obtained and reviewed. No results found for this or any previous visit (from the past 12 hour(s)). []  Vancomycin     []  Invanz     [x]  Cubicin 500 mg IV push     []  Rocephin    was infused per orders without complications. Mr. Cesar Connor tolerated infusion, and had no complaints at this time. Patient armband removed and shredded. Mr. Cesar Connor was discharged from [place] [address] in stable condition at 1050. He is to return on 3/2/18 for his next appointment. Armband removed and shredded.      River Westbrook RN  2018  11:34 AM

## 2018-03-02 ENCOUNTER — HOSPITAL ENCOUNTER (OUTPATIENT)
Dept: INFUSION THERAPY | Age: 58
Discharge: HOME | End: 2018-03-02
Payer: COMMERCIAL

## 2018-03-02 VITALS
TEMPERATURE: 98.1 F | RESPIRATION RATE: 16 BRPM | SYSTOLIC BLOOD PRESSURE: 130 MMHG | OXYGEN SATURATION: 98 % | DIASTOLIC BLOOD PRESSURE: 83 MMHG | HEART RATE: 81 BPM

## 2018-03-02 PROCEDURE — 96374 THER/PROPH/DIAG INJ IV PUSH: CPT

## 2018-03-02 PROCEDURE — 74011250636 HC RX REV CODE- 250/636: Performed by: PODIATRIST

## 2018-03-02 PROCEDURE — 74011000272 HC RX REV CODE- 272: Performed by: PODIATRIST

## 2018-03-02 RX ORDER — HEPARIN 100 UNIT/ML
SYRINGE INTRAVENOUS
Status: DISPENSED
Start: 2018-03-02 | End: 2018-03-03

## 2018-03-02 RX ORDER — HEPARIN 100 UNIT/ML
500 SYRINGE INTRAVENOUS ONCE
Status: COMPLETED | OUTPATIENT
Start: 2018-03-02 | End: 2018-03-02

## 2018-03-02 RX ORDER — SODIUM CHLORIDE 0.9 % (FLUSH) 0.9 %
10-40 SYRINGE (ML) INJECTION AS NEEDED
Status: DISCONTINUED | OUTPATIENT
Start: 2018-03-02 | End: 2018-03-06 | Stop reason: HOSPADM

## 2018-03-02 RX ADMIN — Medication 10 ML: at 12:45

## 2018-03-02 RX ADMIN — Medication 20 ML: at 12:43

## 2018-03-02 RX ADMIN — HEPARIN 500 UNITS: 100 SYRINGE at 12:45

## 2018-03-02 RX ADMIN — DAPTOMYCIN 500 MG: 500 INJECTION, POWDER, LYOPHILIZED, FOR SOLUTION INTRAVENOUS at 12:43

## 2018-03-02 NOTE — PROGRESS NOTES
\[facility]\"" Progress Note    Date: 2018    Name: Rebekah Linda    MRN: [id number]         : 1960    Daptomycin IV Push    Mr. Rikki Zhang to [facility], ambulatory at 1235 wearing walking boot to left foot. Pt was assessed and education was provided. Patient saw Dr. Jaya Larsen and was told that he can end his antibiotics early, after tomorrow's dose. He denies complaints today or side effects with the IV Daptomycin. Mr. Mao Hills vitals were reviewed. Visit Vitals    /83 (BP 1 Location: Left arm, BP Patient Position: Sitting)    Pulse 81    Temp 98.1 °F (36.7 °C)    Resp 16    SpO2 98%       Right upper arm PICC flushed easily and had brisk blood return via both ports. PICC dressing c/d/i and not due to be changed. No swelling, redness, streaking, warmth or drainage noted in arm. Pt denied c/o pain in arm.      []  Vancomycin     []  Invanz     [x]  Cubicin 500 mg     []  Rocephin    was administered slow IV push over approximately 2 minutes. Mr. Rikki Zhang tolerated infusion, and had no complaints at this time. Both lumens of PICC flushed with NS 10 ml and Heparin 250 units. Green end caps applied, and lumens wrapped with guaze and paper tape. Stockinette placed over dressing for protection. Patient armband removed and shredded. Mr. Rikki Zhang was discharged from [place] [address] in stable condition at [phone]. He is to return on 3/3/18 at 0930 for his next antibiotic appointment.     Blanquita Russell RN  2018

## 2018-03-03 ENCOUNTER — HOSPITAL ENCOUNTER (OUTPATIENT)
Dept: INFUSION THERAPY | Age: 58
Discharge: HOME | End: 2018-03-03
Payer: COMMERCIAL

## 2018-03-03 VITALS
TEMPERATURE: 97 F | RESPIRATION RATE: 16 BRPM | OXYGEN SATURATION: 98 % | SYSTOLIC BLOOD PRESSURE: 151 MMHG | DIASTOLIC BLOOD PRESSURE: 83 MMHG | HEART RATE: 82 BPM

## 2018-03-03 PROCEDURE — 74011000272 HC RX REV CODE- 272: Performed by: PODIATRIST

## 2018-03-03 PROCEDURE — 99212 OFFICE O/P EST SF 10 MIN: CPT

## 2018-03-03 PROCEDURE — 96374 THER/PROPH/DIAG INJ IV PUSH: CPT

## 2018-03-03 PROCEDURE — 74011250636 HC RX REV CODE- 250/636: Performed by: PODIATRIST

## 2018-03-03 RX ADMIN — DAPTOMYCIN 500 MG: 500 INJECTION, POWDER, LYOPHILIZED, FOR SOLUTION INTRAVENOUS at 09:00

## 2018-03-03 NOTE — PROGRESS NOTES
[facility] Progress Note    Date: March 3, 2018    Name: Abbey Choudhary    MRN: [id number]         : 1960     IV Antibiotics/PICC removal    Mr. Tamanna Laguerre was assessed and education was provided. Mr. Anna Hudson vitals were reviewed. Visit Vitals    /83 (BP 1 Location: Left arm, BP Patient Position: Sitting)    Pulse 82    Temp 97 °F (36.1 °C)    Resp 16    SpO2 98%           []  Vancomycin     []  Invanz     [x]  Cubicin 500 mg IV push     []  Rocephin    was infused per orders without complications. Mr. Tamanna Laguerre tolerated infusion, and had no complaints at this time. PICC line was flushed with 10 ml of NS and removed per orders per protocol without complications. No bleeding. Gauze and transparent dressing applied to site. Mr. Tamanna Laguerre was observed in office for 30 minutes post PICC removal. No bleeding, swelling, redness or complications noted. Verbal and written discharge instructions reviewed with Mr. Tamanna Laguerre. Mr. Tamanna Laguerre verbalized understanding. Mr. Tamanna Laguerre was discharged from [place] [address] in stable condition at [address] [place] [address]. He has no further appointments scheduled at this time and will follow up with Dr. Jill Dunbar as advised. Armband removed and shredded.      Jignesh Soto RN  March 3, 2018  10:24 AM

## 2018-03-03 NOTE — DISCHARGE INSTRUCTIONS
OUTPATIENT INFUSION CENTER    DISCHARGE INSTRUCTIONS FOR:  REMOVAL OFPICC  LINE    Now that your PICC Line has been removed, please follow the instructions below regarding your site care: You will be kept resting in a supine position for 30--60 minutes immediately after the PICC has been removed. Avoid heavy lifting or excessive use of the extremity for 24 hours. If drainage or bleeding is present, apply direct pressure until it has stopped. If bleeding persists, continue pressure and seek emergency medical care. The area under the dressing must be kept clean and dry for 3 days. Do not submerge the area in water for 3 days. You may shower, but cover the dressing with clear plastic wrap and apply tape around the edges. Remove the plastic wrap after showering. After 3 days, the dressing may be removed. Before removing the dressing, wash hands thoroughly with soap and water. Inspect the site. Gently wash with warm soapy water. Pat dry and re-cover with a band aid until a scab forms. Report to your physician any of the following:    Chills and fever;  Swelling, redness, pain or if site is warm to the touch; Any pus or unusual drainage from the site; Any questions or concerns.     Lisa Kim, Signature: ______________________________ 3/3/2018  Kitty Felipe RN

## 2018-03-04 ENCOUNTER — HOSPITAL ENCOUNTER (OUTPATIENT)
Dept: INFUSION THERAPY | Age: 58
Discharge: HOME | End: 2018-03-04
Payer: COMMERCIAL

## 2018-03-05 ENCOUNTER — HOSPITAL ENCOUNTER (OUTPATIENT)
Dept: INFUSION THERAPY | Age: 58
Discharge: HOME | End: 2018-03-05
Payer: COMMERCIAL

## 2022-04-29 ENCOUNTER — OFFICE VISIT (OUTPATIENT)
Dept: URGENT CARE | Age: 62
End: 2022-04-29
Payer: COMMERCIAL

## 2022-04-29 VITALS
DIASTOLIC BLOOD PRESSURE: 69 MMHG | BODY MASS INDEX: 25.34 KG/M2 | WEIGHT: 181 LBS | SYSTOLIC BLOOD PRESSURE: 156 MMHG | TEMPERATURE: 97.8 F | RESPIRATION RATE: 16 BRPM | HEIGHT: 71 IN | OXYGEN SATURATION: 99 % | HEART RATE: 94 BPM

## 2022-04-29 DIAGNOSIS — L50.9 URTICARIAL RASH: Primary | ICD-10-CM

## 2022-04-29 PROCEDURE — 99202 OFFICE O/P NEW SF 15 MIN: CPT | Performed by: NURSE PRACTITIONER

## 2022-04-29 RX ORDER — CETIRIZINE HCL 10 MG
5 TABLET ORAL
COMMUNITY

## 2022-04-29 RX ORDER — PREDNISONE 10 MG/1
TABLET ORAL
Qty: 21 TABLET | Refills: 0 | Status: SHIPPED | OUTPATIENT
Start: 2022-04-29

## 2022-04-29 NOTE — PROGRESS NOTES
Subjective: (As above and below)     The patient/guardian gave verbal consent to treat. Chief Complaint   Patient presents with    Rash     Pt here today for evaluation of rash over body. States hands, back, legs affected primarily. Goes away at night, believes it to be related to his allergies. Symptoms present several months now     Mervin Bishop is a 64 y.o. male who presents for hives  This is a chronic condition  Has been managed in past by allergist  Temporal associated with pollen in spring  Zyrtec usually resolves, he takes this daily. This spring it is not helping as much as usual.  Has needed steroids in past.  Denies any SOB, wheezing, fever, chills or other exposures or new medications      ROS  Review of Systems - negative except as listed above    Reviewed PmHx, RxHx, FmHx, SocHx, AllgHx and updated in chart. Family History   Problem Relation Age of Onset    Hypertension Father     Hypertension Brother        Past Medical History:   Diagnosis Date    Hypertension       Social History     Socioeconomic History    Marital status: SINGLE   Tobacco Use    Smoking status: Never Smoker    Smokeless tobacco: Never Used   Substance and Sexual Activity    Alcohol use: Yes     Comment: in frequent    Drug use: No          Current Outpatient Medications   Medication Sig    cetirizine (ZyrTEC) 10 mg tablet Take 5 mg by mouth.  predniSONE (STERAPRED DS) 10 mg dose pack Take per dose pack instructions    fluticasone (FLONASE) 50 mcg/actuation nasal spray 2 Sprays by Both Nostrils route daily.  losartan (COZAAR) 50 mg tablet Take 100 mg by mouth daily.  amLODIPine (NORVASC) 10 mg tablet Take 10 mg by mouth daily. No current facility-administered medications for this visit.        Objective:     Vitals:    04/29/22 0813   BP: (!) 156/69   Pulse: 94   Resp: 16   Temp: 97.8 °F (36.6 °C)   SpO2: 99%   Weight: 181 lb (82.1 kg)   Height: 5' 11\" (1.803 m)       Physical Exam  Skin: General appearance - appears well hydrated and does not appear toxic, no acute distress  Eyes - EOMs intact. Non injected. No scleral icterus   Ears - no external swelling. Nose -  No congestion noted  Neck/Lymphatics - trachea midline, full AROM, no LAD of neck  Chest - Normal breathing effort no wheeze rales, rhonchi or diminishments bilaterally. Heart - RRR, no murmurs  Skin - no rashes on palms of hands  Neurologic- alert and oriented x 3  Psychiatric- normal mood, behavior and though content. Assessment/ Plan:     1. Urticarial rash    - predniSONE (STERAPRED DS) 10 mg dose pack; Take per dose pack instructions  Dispense: 21 Tablet; Refill: 0      Chronic rash has been evaluated by allergist in past- seasonal association  Continue zyrtec  Will treat with short course of steroids to calm down flare up.   Shower nightly to wash off pollen  Follow up with allergist if rash returns      Mariam Stratton NP